# Patient Record
Sex: FEMALE | Race: WHITE | NOT HISPANIC OR LATINO | Employment: FULL TIME | URBAN - METROPOLITAN AREA
[De-identification: names, ages, dates, MRNs, and addresses within clinical notes are randomized per-mention and may not be internally consistent; named-entity substitution may affect disease eponyms.]

---

## 2023-05-17 ENCOUNTER — APPOINTMENT (OUTPATIENT)
Dept: PRIMARY CARE | Facility: CLINIC | Age: 43
End: 2023-05-17
Payer: COMMERCIAL

## 2023-05-17 PROBLEM — F41.9 ANXIETY DISORDER: Status: ACTIVE | Noted: 2023-05-17

## 2023-05-17 PROBLEM — G47.00 INSOMNIA: Status: ACTIVE | Noted: 2023-05-17

## 2023-05-17 PROBLEM — F32.A DEPRESSION: Status: ACTIVE | Noted: 2023-05-17

## 2023-05-17 PROBLEM — E03.9 HYPOTHYROIDISM, ADULT: Status: ACTIVE | Noted: 2023-05-17

## 2023-05-22 ENCOUNTER — OFFICE VISIT (OUTPATIENT)
Dept: PRIMARY CARE | Facility: CLINIC | Age: 43
End: 2023-05-22
Payer: COMMERCIAL

## 2023-05-22 ENCOUNTER — LAB (OUTPATIENT)
Dept: LAB | Facility: LAB | Age: 43
End: 2023-05-22
Payer: COMMERCIAL

## 2023-05-22 VITALS
BODY MASS INDEX: 39.71 KG/M2 | DIASTOLIC BLOOD PRESSURE: 66 MMHG | HEART RATE: 82 BPM | RESPIRATION RATE: 16 BRPM | TEMPERATURE: 97.3 F | SYSTOLIC BLOOD PRESSURE: 108 MMHG | WEIGHT: 253 LBS | HEIGHT: 67 IN

## 2023-05-22 DIAGNOSIS — R53.83 FATIGUE, UNSPECIFIED TYPE: ICD-10-CM

## 2023-05-22 DIAGNOSIS — Z00.00 HEALTH CARE MAINTENANCE: ICD-10-CM

## 2023-05-22 DIAGNOSIS — G47.33 OSA (OBSTRUCTIVE SLEEP APNEA): Primary | ICD-10-CM

## 2023-05-22 DIAGNOSIS — L60.9 NAIL ABNORMALITIES: ICD-10-CM

## 2023-05-22 LAB
ALANINE AMINOTRANSFERASE (SGPT) (U/L) IN SER/PLAS: 15 U/L (ref 7–45)
ALBUMIN (G/DL) IN SER/PLAS: 4.2 G/DL (ref 3.4–5)
ALKALINE PHOSPHATASE (U/L) IN SER/PLAS: 50 U/L (ref 33–110)
ANION GAP IN SER/PLAS: 11 MMOL/L (ref 10–20)
ASPARTATE AMINOTRANSFERASE (SGOT) (U/L) IN SER/PLAS: 17 U/L (ref 9–39)
BILIRUBIN TOTAL (MG/DL) IN SER/PLAS: 0.2 MG/DL (ref 0–1.2)
CALCIUM (MG/DL) IN SER/PLAS: 9.2 MG/DL (ref 8.6–10.3)
CARBON DIOXIDE, TOTAL (MMOL/L) IN SER/PLAS: 27 MMOL/L (ref 21–32)
CHLORIDE (MMOL/L) IN SER/PLAS: 102 MMOL/L (ref 98–107)
COBALAMIN (VITAMIN B12) (PG/ML) IN SER/PLAS: 374 PG/ML (ref 211–911)
CREATININE (MG/DL) IN SER/PLAS: 0.92 MG/DL (ref 0.5–1.05)
ERYTHROCYTE DISTRIBUTION WIDTH (RATIO) BY AUTOMATED COUNT: 17.2 % (ref 11.5–14.5)
ERYTHROCYTE MEAN CORPUSCULAR HEMOGLOBIN CONCENTRATION (G/DL) BY AUTOMATED: 30.5 G/DL (ref 32–36)
ERYTHROCYTE MEAN CORPUSCULAR VOLUME (FL) BY AUTOMATED COUNT: 82 FL (ref 80–100)
ERYTHROCYTES (10*6/UL) IN BLOOD BY AUTOMATED COUNT: 4.45 X10E12/L (ref 4–5.2)
GFR FEMALE: 80 ML/MIN/1.73M2
GLUCOSE (MG/DL) IN SER/PLAS: 79 MG/DL (ref 74–99)
HEMATOCRIT (%) IN BLOOD BY AUTOMATED COUNT: 36.4 % (ref 36–46)
HEMOGLOBIN (G/DL) IN BLOOD: 11.1 G/DL (ref 12–16)
LEUKOCYTES (10*3/UL) IN BLOOD BY AUTOMATED COUNT: 8.9 X10E9/L (ref 4.4–11.3)
PLATELETS (10*3/UL) IN BLOOD AUTOMATED COUNT: 353 X10E9/L (ref 150–450)
POTASSIUM (MMOL/L) IN SER/PLAS: 4.5 MMOL/L (ref 3.5–5.3)
PROTEIN TOTAL: 7.6 G/DL (ref 6.4–8.2)
SODIUM (MMOL/L) IN SER/PLAS: 135 MMOL/L (ref 136–145)
UREA NITROGEN (MG/DL) IN SER/PLAS: 16 MG/DL (ref 6–23)

## 2023-05-22 PROCEDURE — 83550 IRON BINDING TEST: CPT

## 2023-05-22 PROCEDURE — 80053 COMPREHEN METABOLIC PANEL: CPT

## 2023-05-22 PROCEDURE — 85027 COMPLETE CBC AUTOMATED: CPT

## 2023-05-22 PROCEDURE — 83540 ASSAY OF IRON: CPT

## 2023-05-22 PROCEDURE — 99213 OFFICE O/P EST LOW 20 MIN: CPT | Performed by: FAMILY MEDICINE

## 2023-05-22 PROCEDURE — 82728 ASSAY OF FERRITIN: CPT

## 2023-05-22 PROCEDURE — 82607 VITAMIN B-12: CPT

## 2023-05-22 PROCEDURE — 36415 COLL VENOUS BLD VENIPUNCTURE: CPT

## 2023-05-22 PROCEDURE — 1036F TOBACCO NON-USER: CPT | Performed by: FAMILY MEDICINE

## 2023-05-22 PROCEDURE — 84630 ASSAY OF ZINC: CPT

## 2023-05-22 RX ORDER — LEVOTHYROXINE SODIUM 50 UG/1
1 TABLET ORAL DAILY
COMMUNITY
End: 2024-02-05 | Stop reason: SDUPTHER

## 2023-05-22 RX ORDER — VENLAFAXINE 75 MG/1
75 TABLET ORAL DAILY
COMMUNITY
End: 2023-05-31

## 2023-05-22 RX ORDER — NORETHINDRONE ACETATE AND ETHINYL ESTRADIOL 1; 20 MG/1; UG/1
1 TABLET ORAL DAILY
COMMUNITY
Start: 2023-05-09 | End: 2024-03-04

## 2023-05-22 RX ORDER — SERTRALINE HYDROCHLORIDE 25 MG/1
25 TABLET, FILM COATED ORAL DAILY
COMMUNITY
Start: 2023-05-15 | End: 2023-12-19 | Stop reason: ALTCHOICE

## 2023-05-22 RX ORDER — HYDROXYZINE HYDROCHLORIDE 25 MG/1
1 TABLET, FILM COATED ORAL 3 TIMES DAILY PRN
COMMUNITY
Start: 2022-06-28 | End: 2023-12-19 | Stop reason: ALTCHOICE

## 2023-05-22 ASSESSMENT — PATIENT HEALTH QUESTIONNAIRE - PHQ9
10. IF YOU CHECKED OFF ANY PROBLEMS, HOW DIFFICULT HAVE THESE PROBLEMS MADE IT FOR YOU TO DO YOUR WORK, TAKE CARE OF THINGS AT HOME, OR GET ALONG WITH OTHER PEOPLE: SOMEWHAT DIFFICULT
2. FEELING DOWN, DEPRESSED OR HOPELESS: SEVERAL DAYS
1. LITTLE INTEREST OR PLEASURE IN DOING THINGS: SEVERAL DAYS
SUM OF ALL RESPONSES TO PHQ9 QUESTIONS 1 & 2: 2

## 2023-05-22 NOTE — PROGRESS NOTES
"Subjective   Patient ID: Minal Stuart is a 42 y.o. female who presents for OTHER (Fingernail  from skin underneath).    Started a few weeks ago   no chemicals or trama   started thyroid in February March   Been tired   Dx WING  Weaning off desvenlafaxine  and going to try zoloft  only new med is thyroid and will be getting it checked soon         Review of Systems    Objective   /66 (BP Location: Right arm, Patient Position: Sitting, BP Cuff Size: Large adult)   Pulse 82   Temp 36.3 °C (97.3 °F) (Temporal)   Resp 16   Ht 1.702 m (5' 7\")   Wt 115 kg (253 lb)   BMI 39.63 kg/m²     Physical Exam  Skin:     Comments: Lift corner nails right 1,2,4 and left, 2,4          Assessment/Plan   Problem List Items Addressed This Visit          Nervous    WING (obstructive sleep apnea) - Primary     Getting that done           Other Visit Diagnoses       Health care maintenance        Fatigue, unspecified type        Relevant Orders    CBC    Comprehensive Metabolic Panel    Vitamin B12    Nail abnormalities        Relevant Orders    Zinc               "

## 2023-05-23 DIAGNOSIS — D64.9 ANEMIA, UNSPECIFIED TYPE: Primary | ICD-10-CM

## 2023-05-23 DIAGNOSIS — D50.9 IRON DEFICIENCY ANEMIA, UNSPECIFIED IRON DEFICIENCY ANEMIA TYPE: Primary | ICD-10-CM

## 2023-05-23 LAB
FERRITIN (UG/LL) IN SER/PLAS: 8 UG/L (ref 8–150)
IRON (UG/DL) IN SER/PLAS: 30 UG/DL (ref 35–150)
IRON BINDING CAPACITY (UG/DL) IN SER/PLAS: 460 UG/DL (ref 240–445)
IRON SATURATION (%) IN SER/PLAS: 7 % (ref 25–45)

## 2023-05-25 LAB — ZINC,SERUM OR PLASMA: 68.3 UG/DL (ref 60–120)

## 2023-05-31 ENCOUNTER — OFFICE VISIT (OUTPATIENT)
Dept: PRIMARY CARE | Facility: CLINIC | Age: 43
End: 2023-05-31
Payer: COMMERCIAL

## 2023-05-31 VITALS
RESPIRATION RATE: 16 BRPM | SYSTOLIC BLOOD PRESSURE: 98 MMHG | WEIGHT: 248.3 LBS | HEART RATE: 88 BPM | BODY MASS INDEX: 38.89 KG/M2 | TEMPERATURE: 96.8 F | DIASTOLIC BLOOD PRESSURE: 76 MMHG

## 2023-05-31 DIAGNOSIS — N92.0 MENORRHAGIA WITH REGULAR CYCLE: ICD-10-CM

## 2023-05-31 DIAGNOSIS — D50.0 IRON DEFICIENCY ANEMIA DUE TO CHRONIC BLOOD LOSS: Primary | ICD-10-CM

## 2023-05-31 DIAGNOSIS — E03.9 HYPOTHYROIDISM, ADULT: ICD-10-CM

## 2023-05-31 LAB
ALANINE AMINOTRANSFERASE (SGPT) (U/L) IN SER/PLAS: 17 U/L (ref 7–45)
ALBUMIN (G/DL) IN SER/PLAS: 4.1 G/DL (ref 3.4–5)
ALKALINE PHOSPHATASE (U/L) IN SER/PLAS: 53 U/L (ref 33–110)
ANION GAP IN SER/PLAS: 13 MMOL/L (ref 10–20)
ASPARTATE AMINOTRANSFERASE (SGOT) (U/L) IN SER/PLAS: 19 U/L (ref 9–39)
BILIRUBIN TOTAL (MG/DL) IN SER/PLAS: 0.4 MG/DL (ref 0–1.2)
CALCIUM (MG/DL) IN SER/PLAS: 9.3 MG/DL (ref 8.6–10.3)
CARBON DIOXIDE, TOTAL (MMOL/L) IN SER/PLAS: 27 MMOL/L (ref 21–32)
CHLORIDE (MMOL/L) IN SER/PLAS: 103 MMOL/L (ref 98–107)
CREATININE (MG/DL) IN SER/PLAS: 0.97 MG/DL (ref 0.5–1.05)
GFR FEMALE: 75 ML/MIN/1.73M2
GLUCOSE (MG/DL) IN SER/PLAS: 84 MG/DL (ref 74–99)
POTASSIUM (MMOL/L) IN SER/PLAS: 5 MMOL/L (ref 3.5–5.3)
PROTEIN TOTAL: 7.5 G/DL (ref 6.4–8.2)
SODIUM (MMOL/L) IN SER/PLAS: 138 MMOL/L (ref 136–145)
THYROTROPIN (MIU/L) IN SER/PLAS BY DETECTION LIMIT <= 0.05 MIU/L: 2.48 MIU/L (ref 0.44–3.98)
THYROXINE (T4) FREE (NG/DL) IN SER/PLAS: 0.73 NG/DL (ref 0.61–1.12)
UREA NITROGEN (MG/DL) IN SER/PLAS: 11 MG/DL (ref 6–23)

## 2023-05-31 PROCEDURE — 99213 OFFICE O/P EST LOW 20 MIN: CPT | Performed by: FAMILY MEDICINE

## 2023-05-31 PROCEDURE — 1036F TOBACCO NON-USER: CPT | Performed by: FAMILY MEDICINE

## 2023-05-31 ASSESSMENT — PATIENT HEALTH QUESTIONNAIRE - PHQ9
2. FEELING DOWN, DEPRESSED OR HOPELESS: NOT AT ALL
1. LITTLE INTEREST OR PLEASURE IN DOING THINGS: SEVERAL DAYS
SUM OF ALL RESPONSES TO PHQ9 QUESTIONS 1 & 2: 1
10. IF YOU CHECKED OFF ANY PROBLEMS, HOW DIFFICULT HAVE THESE PROBLEMS MADE IT FOR YOU TO DO YOUR WORK, TAKE CARE OF THINGS AT HOME, OR GET ALONG WITH OTHER PEOPLE: SOMEWHAT DIFFICULT

## 2023-05-31 NOTE — PROGRESS NOTES
Subjective   Patient ID: Minal Stuart is a 42 y.o. female who presents for Menorrhagia (Irregular menses on an off since Nov, this am very heavy blood flow with clots).    Saw GYN and put on birth control for bleeding, has had cramping for a few week on the BCP which started last month. Took placebo pill last night and having heavy bleeding this morning with clots          Review of Systems    Objective   BP 98/76 (BP Location: Right arm, Patient Position: Sitting, BP Cuff Size: Large adult)   Pulse 88   Temp 36 °C (96.8 °F) (Temporal)   Resp 16   Wt 113 kg (248 lb 4.8 oz)   BMI 38.89 kg/m²     Physical Exam  Vitals and nursing note reviewed.   Constitutional:       General: She is not in acute distress.     Appearance: She is not ill-appearing.   HENT:      Head: Normocephalic and atraumatic.   Eyes:      Conjunctiva/sclera: Conjunctivae normal.   Cardiovascular:      Rate and Rhythm: Normal rate and regular rhythm.      Heart sounds: Normal heart sounds.   Pulmonary:      Effort: Pulmonary effort is normal.      Breath sounds: Normal breath sounds.   Skin:     General: Skin is warm.   Neurological:      Mental Status: She is alert.   Psychiatric:         Mood and Affect: Mood normal.         Thought Content: Thought content normal.         Judgment: Judgment normal.         Assessment/Plan   Problem List Items Addressed This Visit          Endocrine/Metabolic    Hypothyroidism, adult     Other Visit Diagnoses       Iron deficiency anemia due to chronic blood loss    -  Primary    Relevant Orders    CBC    Ferritin    Iron and TIBC

## 2023-06-26 ENCOUNTER — LAB (OUTPATIENT)
Dept: LAB | Facility: LAB | Age: 43
End: 2023-06-26
Payer: COMMERCIAL

## 2023-06-26 DIAGNOSIS — D50.9 IRON DEFICIENCY ANEMIA, UNSPECIFIED IRON DEFICIENCY ANEMIA TYPE: ICD-10-CM

## 2023-06-26 LAB
BASOPHILS (10*3/UL) IN BLOOD BY AUTOMATED COUNT: 0.04 X10E9/L (ref 0–0.1)
BASOPHILS/100 LEUKOCYTES IN BLOOD BY AUTOMATED COUNT: 0.6 % (ref 0–2)
EOSINOPHILS (10*3/UL) IN BLOOD BY AUTOMATED COUNT: 0.29 X10E9/L (ref 0–0.7)
EOSINOPHILS/100 LEUKOCYTES IN BLOOD BY AUTOMATED COUNT: 4.6 % (ref 0–6)
ERYTHROCYTE DISTRIBUTION WIDTH (RATIO) BY AUTOMATED COUNT: 16.5 % (ref 11.5–14.5)
ERYTHROCYTE MEAN CORPUSCULAR HEMOGLOBIN CONCENTRATION (G/DL) BY AUTOMATED: 31.3 G/DL (ref 32–36)
ERYTHROCYTE MEAN CORPUSCULAR VOLUME (FL) BY AUTOMATED COUNT: 84 FL (ref 80–100)
ERYTHROCYTES (10*6/UL) IN BLOOD BY AUTOMATED COUNT: 4.7 X10E12/L (ref 4–5.2)
FERRITIN (UG/LL) IN SER/PLAS: 30 UG/L (ref 8–150)
HEMATOCRIT (%) IN BLOOD BY AUTOMATED COUNT: 39.6 % (ref 36–46)
HEMOGLOBIN (G/DL) IN BLOOD: 12.4 G/DL (ref 12–16)
IMMATURE GRANULOCYTES/100 LEUKOCYTES IN BLOOD BY AUTOMATED COUNT: 0.2 % (ref 0–0.9)
IRON (UG/DL) IN SER/PLAS: 43 UG/DL (ref 35–150)
IRON BINDING CAPACITY (UG/DL) IN SER/PLAS: 386 UG/DL (ref 240–445)
IRON SATURATION (%) IN SER/PLAS: 11 % (ref 25–45)
LEUKOCYTES (10*3/UL) IN BLOOD BY AUTOMATED COUNT: 6.3 X10E9/L (ref 4.4–11.3)
LYMPHOCYTES (10*3/UL) IN BLOOD BY AUTOMATED COUNT: 2.04 X10E9/L (ref 1.2–4.8)
LYMPHOCYTES/100 LEUKOCYTES IN BLOOD BY AUTOMATED COUNT: 32.3 % (ref 13–44)
MONOCYTES (10*3/UL) IN BLOOD BY AUTOMATED COUNT: 0.49 X10E9/L (ref 0.1–1)
MONOCYTES/100 LEUKOCYTES IN BLOOD BY AUTOMATED COUNT: 7.8 % (ref 2–10)
NEUTROPHILS (10*3/UL) IN BLOOD BY AUTOMATED COUNT: 3.44 X10E9/L (ref 1.2–7.7)
NEUTROPHILS/100 LEUKOCYTES IN BLOOD BY AUTOMATED COUNT: 54.5 % (ref 40–80)
PLATELETS (10*3/UL) IN BLOOD AUTOMATED COUNT: 304 X10E9/L (ref 150–450)

## 2023-06-26 PROCEDURE — 36415 COLL VENOUS BLD VENIPUNCTURE: CPT

## 2023-06-26 PROCEDURE — 83550 IRON BINDING TEST: CPT

## 2023-06-26 PROCEDURE — 82728 ASSAY OF FERRITIN: CPT

## 2023-06-26 PROCEDURE — 83540 ASSAY OF IRON: CPT

## 2023-06-28 LAB
ALLERGEN ANIMAL: CAT DANDER IGE (KU/L): 16.2 KU/L
ALLERGEN ANIMAL: DOG DANDER IGE (KU/L): 9.18 KU/L
ALLERGEN GRASS: BERMUDA GRASS (CYNODON DACTYLON) IGE (KU/L): 0.18 KU/L
ALLERGEN GRASS: JOHNSON GRASS (SORGHUM HALEPENSE) IGE (KU/L): 0.17 KU/L
ALLERGEN GRASS: MEADOW GRASS, KENTUCKY BLUE (POA PRATENSIS )IGE (KU/L): 0.11 KU/L
ALLERGEN GRASS: TIMOTHY GRASS (PHLEUM PRATENSE) IGE (KU/L): 0.19 KU/L
ALLERGEN INSECT: COCKROACH IGE: <0.1 KU/L
ALLERGEN MICROORGANISM: ALTERNARIA ALTERNATA IGE (KU/L): 0.63 KU/L
ALLERGEN MICROORGANISM: ASPERGILLUS FUMIGATUS IGE (KU/L): 0.6 KU/L
ALLERGEN MICROORGANISM: CLADOSPORIUM HERBARUM IGE (KU/L): 0.23 KU/L
ALLERGEN MICROORGANISM: PENICILLIUM CHRYSOGENUM (P. NOTATUM) IGE (KU/L): 0.54 KU/L
ALLERGEN MITE: DERMATOPHAGOIDES FARINAE (HOUSE DUST MITE) IGE (KU/L): 0.3 KU/L
ALLERGEN MITE: DERMATOPHAGOIDES PTERONYSSINUS (HOUSE DUST MITE) IGE (KU/L): 0.22 KU/L
ALLERGEN TREE: BOX-ELDER (ACER NEGUNDO) IGE (KU/L): 0.2 KU/L
ALLERGEN TREE: COMMON SILVER BIRCH (BETULA VERRUCOSA) IGE (KU/L): 0.29 KU/L
ALLERGEN TREE: COTTONWOOD (POPULUS DELTOIDES) IGE (KU/L): 0.12 KU/L
ALLERGEN TREE: ELM (ULMUS AMERICANA) IGE (KU/L): 1.06 KU/L
ALLERGEN TREE: MAPLE LEAF SYCAMORE, LONDON PLANE IGE (KU/L): 0.35 KU/L
ALLERGEN TREE: MOUNTAIN JUNIPER (JUNIPERUS SABINOIDES) IGE (KU/L): 0.2 KU/L
ALLERGEN TREE: MULBERRY (MORUS ALBA) IGE (KU/L): <0.1 KU/L
ALLERGEN TREE: OAK (QUERCUS ALBA) IGE (KU/L): 0.23 KU/L
ALLERGEN TREE: PECAN, HICKORY (CARYA PECAN) IGE (KU/L): 0.7 KU/L
ALLERGEN TREE: WALNUT IGE: 0.91 KU/L
ALLERGEN TREE: WHITE ASH (FRAXINUS AMERICANA) IGE (KU/L): 0.18 KU/L
ALLERGEN WEED: COMMON PIGWEED (AMARANTHUS RETROFLEXUS) IGE (KU/L): 0.84 KU/L
ALLERGEN WEED: COMMON RAGWEED (AMB. ARTEMISIIFOLIA/A. ELATIOR) IGE (KU/L): 1.17 KU/L
ALLERGEN WEED: GOOSEFOOT, LAMB'S QUARTERS (CHENOPODIUM ALBUM) IGE (KU/L): 0.23 KU/L
ALLERGEN WEED: PLANTAIN (ENGLISH), RIBWORT (PLANTAGO LANCEOLATA) IGE (KU/L): <0.1 KU/L
ALLERGEN WEED: PRICKLY SALTWORT/RUSSIAN THISTLE (SALSOLA KALI) IGE (KU/L): 0.31 KU/L
ALLERGEN WEED: SHEEP SORREL (RUMEX ACETOSELLA) IGE (KU/L): <0.1 KU/L
IMMUNOCAP IGE: 402 KU/L (ref 0–214)
IMMUNOCAP INTERPRETATION: ABNORMAL

## 2023-07-14 ASSESSMENT — PROMIS GLOBAL HEALTH SCALE
EMOTIONAL_PROBLEMS: OFTEN
RATE_SOCIAL_SATISFACTION: VERY GOOD
CARRYOUT_SOCIAL_ACTIVITIES: GOOD
RATE_AVERAGE_PAIN: 2
CARRYOUT_PHYSICAL_ACTIVITIES: COMPLETELY
RATE_AVERAGE_FATIGUE: MODERATE
RATE_PHYSICAL_HEALTH: GOOD
RATE_GENERAL_HEALTH: GOOD
RATE_QUALITY_OF_LIFE: GOOD
RATE_MENTAL_HEALTH: FAIR

## 2023-07-17 ENCOUNTER — OFFICE VISIT (OUTPATIENT)
Dept: PRIMARY CARE | Facility: CLINIC | Age: 43
End: 2023-07-17
Payer: COMMERCIAL

## 2023-07-17 VITALS
WEIGHT: 248.6 LBS | DIASTOLIC BLOOD PRESSURE: 78 MMHG | HEIGHT: 67 IN | RESPIRATION RATE: 16 BRPM | HEART RATE: 76 BPM | TEMPERATURE: 97.5 F | SYSTOLIC BLOOD PRESSURE: 126 MMHG | BODY MASS INDEX: 39.02 KG/M2

## 2023-07-17 DIAGNOSIS — R53.83 FATIGUE, UNSPECIFIED TYPE: ICD-10-CM

## 2023-07-17 DIAGNOSIS — L60.9 NAIL ABNORMALITIES: ICD-10-CM

## 2023-07-17 DIAGNOSIS — M77.8 SHOULDER TENDONITIS, LEFT: ICD-10-CM

## 2023-07-17 DIAGNOSIS — Z12.31 SCREENING MAMMOGRAM, ENCOUNTER FOR: ICD-10-CM

## 2023-07-17 DIAGNOSIS — R19.5 CHANGE IN STOOL: ICD-10-CM

## 2023-07-17 DIAGNOSIS — Z00.00 ROUTINE GENERAL MEDICAL EXAMINATION AT A HEALTH CARE FACILITY: Primary | ICD-10-CM

## 2023-07-17 PROCEDURE — 99396 PREV VISIT EST AGE 40-64: CPT | Performed by: FAMILY MEDICINE

## 2023-07-17 PROCEDURE — 1036F TOBACCO NON-USER: CPT | Performed by: FAMILY MEDICINE

## 2023-07-17 RX ORDER — GLUC/MSM/COLGN2/HYAL/ANTIARTH3 375-375-20
27 TABLET ORAL DAILY
COMMUNITY

## 2023-07-17 NOTE — PROGRESS NOTES
"Subjective   Patient ID: Minal Stuart is a 42 y.o. female who presents for Annual Exam (Chronic lose stool/ diarrhea for about 6 months daily.  ), Nail Problem (Patient C/O her nails are breaking away from her skin. Was seen March /April for thew same issue. ), and Shoulder Pain (Left shoulder pain with movement was discussed at last annual exam).    Left shoulder still sore   Chronically loose (formed)   can get thing and light brown with urgency for 6 months or more and diarrhea   GYN started on BCP in May and menses spotting and cramps gets cramps and heavier menses     Well Adult Physical   Patient here for a comprehensive physical exam.The patient reports problems - several     Do you take any herbs or supplements that were not prescribed by a doctor? yes   Are you taking calcium supplements? no   Are you taking aspirin daily? no     History:  Last pap date: per GYN  Abnormal pap? no               Review of Systems    Objective   /78   Pulse 76   Temp 36.4 °C (97.5 °F)   Resp 16   Ht 1.702 m (5' 7\")   Wt 113 kg (248 lb 9.6 oz)   BMI 38.94 kg/m²     Physical Exam    Assessment/Plan   Problem List Items Addressed This Visit    None  Visit Diagnoses       Nail abnormalities    -  Primary    Relevant Orders    Referral to Dermatology    Shoulder tendonitis, left        Relevant Orders    Referral to Physical Therapy    Change in stool        Relevant Orders    Referral to Gastroenterology    Screening mammogram, encounter for        Relevant Orders    BI mammo bilateral screening tomosynthesis    Fatigue, unspecified type        Relevant Orders    Referral to Rheumatology               "

## 2023-08-31 LAB
ANTI-CENTROMERE: <0.2 AI
ANTI-CHROMATIN: <0.2 AI
ANTI-DNA (DS): 1 IU/ML
ANTI-JO-1 IGG: <0.2 AI
ANTI-NUCLEAR ANTIBODY (ANA): NEGATIVE
ANTI-RIBOSOMAL P: <0.2 AI
ANTI-RNP: <0.2 AI
ANTI-SCL-70: <0.2 AI
ANTI-SM/RNP: <0.2 AI
ANTI-SM: <0.2 AI
ANTI-SSA: <0.2 AI
ANTI-SSB: <0.2 AI

## 2023-09-25 DIAGNOSIS — D50.0 IRON DEFICIENCY ANEMIA DUE TO CHRONIC BLOOD LOSS: Primary | ICD-10-CM

## 2023-09-28 ENCOUNTER — LAB (OUTPATIENT)
Dept: LAB | Facility: LAB | Age: 43
End: 2023-09-28
Payer: COMMERCIAL

## 2023-09-28 DIAGNOSIS — D50.0 IRON DEFICIENCY ANEMIA DUE TO CHRONIC BLOOD LOSS: ICD-10-CM

## 2023-09-28 LAB
ERYTHROCYTE DISTRIBUTION WIDTH (RATIO) BY AUTOMATED COUNT: 14.1 % (ref 11.5–14.5)
ERYTHROCYTE MEAN CORPUSCULAR HEMOGLOBIN CONCENTRATION (G/DL) BY AUTOMATED: 31.4 G/DL (ref 32–36)
ERYTHROCYTE MEAN CORPUSCULAR VOLUME (FL) BY AUTOMATED COUNT: 85 FL (ref 80–100)
ERYTHROCYTES (10*6/UL) IN BLOOD BY AUTOMATED COUNT: 5.08 X10E12/L (ref 4–5.2)
HEMATOCRIT (%) IN BLOOD BY AUTOMATED COUNT: 43.3 % (ref 36–46)
HEMOGLOBIN (G/DL) IN BLOOD: 13.6 G/DL (ref 12–16)
IRON (UG/DL) IN SER/PLAS: 64 UG/DL (ref 35–150)
IRON BINDING CAPACITY (UG/DL) IN SER/PLAS: 386 UG/DL (ref 240–445)
IRON SATURATION (%) IN SER/PLAS: 17 % (ref 25–45)
LEUKOCYTES (10*3/UL) IN BLOOD BY AUTOMATED COUNT: 8.5 X10E9/L (ref 4.4–11.3)
PLATELETS (10*3/UL) IN BLOOD AUTOMATED COUNT: 329 X10E9/L (ref 150–450)

## 2023-09-28 PROCEDURE — 83540 ASSAY OF IRON: CPT

## 2023-09-28 PROCEDURE — 83550 IRON BINDING TEST: CPT

## 2023-09-28 PROCEDURE — 85027 COMPLETE CBC AUTOMATED: CPT

## 2023-09-28 PROCEDURE — 36415 COLL VENOUS BLD VENIPUNCTURE: CPT

## 2023-09-28 PROCEDURE — 82728 ASSAY OF FERRITIN: CPT

## 2023-09-29 LAB — FERRITIN (UG/LL) IN SER/PLAS: 36 UG/L (ref 8–150)

## 2023-12-01 ENCOUNTER — ANCILLARY PROCEDURE (OUTPATIENT)
Dept: RADIOLOGY | Facility: CLINIC | Age: 43
End: 2023-12-01
Payer: COMMERCIAL

## 2023-12-01 DIAGNOSIS — Z12.31 ENCOUNTER FOR SCREENING MAMMOGRAM FOR MALIGNANT NEOPLASM OF BREAST: ICD-10-CM

## 2023-12-01 PROCEDURE — 77063 BREAST TOMOSYNTHESIS BI: CPT | Mod: BILATERAL PROCEDURE | Performed by: STUDENT IN AN ORGANIZED HEALTH CARE EDUCATION/TRAINING PROGRAM

## 2023-12-01 PROCEDURE — 77067 SCR MAMMO BI INCL CAD: CPT | Mod: BILATERAL PROCEDURE | Performed by: STUDENT IN AN ORGANIZED HEALTH CARE EDUCATION/TRAINING PROGRAM

## 2023-12-01 PROCEDURE — 77067 SCR MAMMO BI INCL CAD: CPT

## 2023-12-04 DIAGNOSIS — N92.6 IRREGULAR MENSTRUATION, UNSPECIFIED: ICD-10-CM

## 2023-12-04 DIAGNOSIS — E03.9 HYPOTHYROIDISM, UNSPECIFIED: Primary | ICD-10-CM

## 2023-12-19 ENCOUNTER — OFFICE VISIT (OUTPATIENT)
Dept: OBSTETRICS AND GYNECOLOGY | Facility: CLINIC | Age: 43
End: 2023-12-19
Payer: COMMERCIAL

## 2023-12-19 VITALS
DIASTOLIC BLOOD PRESSURE: 80 MMHG | BODY MASS INDEX: 38.58 KG/M2 | HEIGHT: 68 IN | WEIGHT: 254.6 LBS | SYSTOLIC BLOOD PRESSURE: 124 MMHG

## 2023-12-19 DIAGNOSIS — G43.009 MIGRAINE WITHOUT AURA AND WITHOUT STATUS MIGRAINOSUS, NOT INTRACTABLE: ICD-10-CM

## 2023-12-19 DIAGNOSIS — N93.9 ABNORMAL UTERINE BLEEDING (AUB): Primary | ICD-10-CM

## 2023-12-19 PROCEDURE — 99213 OFFICE O/P EST LOW 20 MIN: CPT | Performed by: OBSTETRICS & GYNECOLOGY

## 2023-12-19 PROCEDURE — 1036F TOBACCO NON-USER: CPT | Performed by: OBSTETRICS & GYNECOLOGY

## 2023-12-19 RX ORDER — SERTRALINE HYDROCHLORIDE 100 MG/1
100 TABLET, FILM COATED ORAL DAILY
COMMUNITY
End: 2024-03-18 | Stop reason: SDUPTHER

## 2023-12-19 NOTE — PROGRESS NOTES
"Assessment   IMPRESSIONS:    1. Abnormal uterine bleeding (AUB)  - overall bleeding pattern has improved since last year with OCP but last month abnormal with a prolonged episode of spotting. EMB last year normal.   - other symptoms include consistent excessive sweating - not in waves like typical hot flashes, labial irritation - now resolved, headaches, brittle nails. Discussed that its difficult to attribute these symptoms to her reproductive hormones, especially while on an OCP. She has lab work orders in place but discussed that the estradiol, FSH and LH are going to be reflective of being on an OCP. She has other labs ordered for her thyroid so recommend proceeding with this workup. Recommend continuing to monitor menses since they are overall improved. If they worsen again then may need to repeat workup  including pelvic ultrasound and endometrial sampling. Can consider changing treatment.     2. Migraine without aura and without status migrainosus, not intractable  - Referral to Neurology; Future    Follow up in 5/2024 for annual or sooner PRN based on symptoms.    Barbara Santoro MD      Subjective   Minal Stuart is a 43 y.o. female here for follow up for AUB and other symptoms    Patient reports her periods are more regular overall. She typically has 2 hours of very heavy bleeding and then it improved. Last month she also had 10 days of spotting prior to her menses paired with 10 days of migraines    She has migraines about twice per month. Last one was for 10 days. She has never seen a neurologist. Desires a referral.    Irritation of labia - resolved    C/o excessive sweating. Continuous - not in waves.     Gynecologic History:    Last Pap: 2020 - NILm/neg HPV    Past medical history, surgical history, social history, family history, medications and allergies were reviewed and edited as needed      Objective   /80   Ht 1.727 m (5' 8\")   Wt 115 kg (254 lb 9.6 oz)   LMP  (LMP Unknown)   " BMI 38.71 kg/m²     No exam performed today

## 2023-12-21 DIAGNOSIS — N93.9 ABNORMAL UTERINE BLEEDING (AUB): Primary | ICD-10-CM

## 2023-12-29 ENCOUNTER — LAB (OUTPATIENT)
Dept: LAB | Facility: LAB | Age: 43
End: 2023-12-29
Payer: COMMERCIAL

## 2023-12-29 DIAGNOSIS — N93.9 ABNORMAL UTERINE BLEEDING (AUB): ICD-10-CM

## 2023-12-29 DIAGNOSIS — E03.9 HYPOTHYROIDISM, UNSPECIFIED: ICD-10-CM

## 2023-12-29 DIAGNOSIS — N92.6 IRREGULAR MENSTRUATION, UNSPECIFIED: ICD-10-CM

## 2023-12-29 DIAGNOSIS — D50.9 IRON DEFICIENCY ANEMIA, UNSPECIFIED IRON DEFICIENCY ANEMIA TYPE: ICD-10-CM

## 2023-12-29 LAB
ALBUMIN SERPL BCP-MCNC: 4.1 G/DL (ref 3.4–5)
ALP SERPL-CCNC: 57 U/L (ref 33–110)
ALT SERPL W P-5'-P-CCNC: 21 U/L (ref 7–45)
ANION GAP SERPL CALC-SCNC: 13 MMOL/L (ref 10–20)
AST SERPL W P-5'-P-CCNC: 19 U/L (ref 9–39)
BILIRUB SERPL-MCNC: 0.4 MG/DL (ref 0–1.2)
BUN SERPL-MCNC: 11 MG/DL (ref 6–23)
CALCIUM SERPL-MCNC: 8.7 MG/DL (ref 8.6–10.3)
CHLORIDE SERPL-SCNC: 101 MMOL/L (ref 98–107)
CO2 SERPL-SCNC: 28 MMOL/L (ref 21–32)
CREAT SERPL-MCNC: 0.93 MG/DL (ref 0.5–1.05)
ERYTHROCYTE [DISTWIDTH] IN BLOOD BY AUTOMATED COUNT: 13.7 % (ref 11.5–14.5)
GFR SERPL CREATININE-BSD FRML MDRD: 79 ML/MIN/1.73M*2
GLUCOSE SERPL-MCNC: 85 MG/DL (ref 74–99)
HCT VFR BLD AUTO: 43.5 % (ref 36–46)
HGB BLD-MCNC: 14 G/DL (ref 12–16)
IRON SATN MFR SERPL: 21 % (ref 25–45)
IRON SERPL-MCNC: 81 UG/DL (ref 35–150)
MCH RBC QN AUTO: 27.9 PG (ref 26–34)
MCHC RBC AUTO-ENTMCNC: 32.2 G/DL (ref 32–36)
MCV RBC AUTO: 87 FL (ref 80–100)
NRBC BLD-RTO: 0 /100 WBCS (ref 0–0)
PLATELET # BLD AUTO: 310 X10*3/UL (ref 150–450)
POTASSIUM SERPL-SCNC: 5.2 MMOL/L (ref 3.5–5.3)
PROT SERPL-MCNC: 7.1 G/DL (ref 6.4–8.2)
RBC # BLD AUTO: 5.02 X10*6/UL (ref 4–5.2)
SODIUM SERPL-SCNC: 137 MMOL/L (ref 136–145)
T4 FREE SERPL-MCNC: 0.8 NG/DL (ref 0.61–1.12)
TIBC SERPL-MCNC: 378 UG/DL (ref 240–445)
TSH SERPL-ACNC: 2.08 MIU/L (ref 0.44–3.98)
UIBC SERPL-MCNC: 297 UG/DL (ref 110–370)
WBC # BLD AUTO: 6.7 X10*3/UL (ref 4.4–11.3)

## 2023-12-29 PROCEDURE — 84443 ASSAY THYROID STIM HORMONE: CPT

## 2023-12-29 PROCEDURE — 36415 COLL VENOUS BLD VENIPUNCTURE: CPT

## 2023-12-29 PROCEDURE — 83002 ASSAY OF GONADOTROPIN (LH): CPT

## 2023-12-29 PROCEDURE — 82670 ASSAY OF TOTAL ESTRADIOL: CPT

## 2023-12-29 PROCEDURE — 86800 THYROGLOBULIN ANTIBODY: CPT

## 2023-12-29 PROCEDURE — 83540 ASSAY OF IRON: CPT

## 2023-12-29 PROCEDURE — 80053 COMPREHEN METABOLIC PANEL: CPT

## 2023-12-29 PROCEDURE — 85027 COMPLETE CBC AUTOMATED: CPT

## 2023-12-29 PROCEDURE — 84481 FREE ASSAY (FT-3): CPT

## 2023-12-29 PROCEDURE — 82728 ASSAY OF FERRITIN: CPT

## 2023-12-29 PROCEDURE — 84432 ASSAY OF THYROGLOBULIN: CPT

## 2023-12-29 PROCEDURE — 84439 ASSAY OF FREE THYROXINE: CPT

## 2023-12-29 PROCEDURE — 83550 IRON BINDING TEST: CPT

## 2023-12-29 PROCEDURE — 86376 MICROSOMAL ANTIBODY EACH: CPT

## 2023-12-29 PROCEDURE — 83001 ASSAY OF GONADOTROPIN (FSH): CPT

## 2023-12-29 PROCEDURE — 84146 ASSAY OF PROLACTIN: CPT

## 2023-12-30 LAB
ESTRADIOL SERPL-MCNC: 144 PG/ML
FERRITIN SERPL-MCNC: 51 NG/ML (ref 8–150)
FSH SERPL-ACNC: 3 IU/L
LH SERPL-ACNC: 3.6 IU/L
PROLACTIN SERPL-MCNC: 9.9 UG/L (ref 3–20)
T3FREE SERPL-MCNC: 2.9 PG/ML (ref 2.3–4.2)
THYROPEROXIDASE AB SERPL-ACNC: <28 IU/ML

## 2024-01-04 LAB
BILL ONLY-THYROGLOBULIN: NORMAL
THYROGLOB AB SERPL-ACNC: <0.9 IU/ML (ref 0–4)
THYROGLOB SERPL-MCNC: 10.3 NG/ML (ref 1.3–31.8)
THYROGLOB SERPL-MCNC: NORMAL NG/ML (ref 1.3–31.8)

## 2024-02-05 DIAGNOSIS — E03.9 HYPOTHYROIDISM, UNSPECIFIED TYPE: Primary | ICD-10-CM

## 2024-02-05 RX ORDER — LEVOTHYROXINE SODIUM 50 UG/1
50 TABLET ORAL DAILY
Qty: 90 TABLET | Refills: 0 | Status: SHIPPED | OUTPATIENT
Start: 2024-02-05 | End: 2024-05-03

## 2024-02-19 DIAGNOSIS — N92.6 IRREGULAR MENSTRUATION, UNSPECIFIED: ICD-10-CM

## 2024-02-19 DIAGNOSIS — E03.9 HYPOTHYROIDISM, UNSPECIFIED: ICD-10-CM

## 2024-02-19 DIAGNOSIS — G47.8 OTHER SLEEP DISORDERS: ICD-10-CM

## 2024-02-19 DIAGNOSIS — D50.9 IRON DEFICIENCY ANEMIA, UNSPECIFIED: ICD-10-CM

## 2024-02-19 DIAGNOSIS — E55.9 VITAMIN D DEFICIENCY, UNSPECIFIED: Primary | ICD-10-CM

## 2024-03-03 DIAGNOSIS — Z30.9 ENCOUNTER FOR CONTRACEPTIVE MANAGEMENT, UNSPECIFIED TYPE: Primary | ICD-10-CM

## 2024-03-04 RX ORDER — NORETHINDRONE ACETATE AND ETHINYL ESTRADIOL 1; 20 MG/1; UG/1
TABLET ORAL
Qty: 84 TABLET | Refills: 4 | Status: SHIPPED | OUTPATIENT
Start: 2024-03-04

## 2024-03-18 DIAGNOSIS — F41.8 MIXED ANXIETY AND DEPRESSIVE DISORDER: ICD-10-CM

## 2024-03-18 RX ORDER — SERTRALINE HYDROCHLORIDE 100 MG/1
100 TABLET, FILM COATED ORAL DAILY
Qty: 90 TABLET | Refills: 1 | Status: SHIPPED | OUTPATIENT
Start: 2024-03-18

## 2024-03-22 ENCOUNTER — TELEPHONE (OUTPATIENT)
Dept: OBSTETRICS AND GYNECOLOGY | Facility: CLINIC | Age: 44
End: 2024-03-22
Payer: COMMERCIAL

## 2024-03-22 NOTE — TELEPHONE ENCOUNTER
Patient called to schedule appointment with Dr. Santoro. Patient has vaginal odor and concerned for BV. Patient ask if she could come in for a self swab. Patient is currently driving from [place]. Patient stated she would go to urgent care.  ------------- MyChart Message-------------  Fred Santoro,     Since I visited in December, I have continued to have week-long migraines the week before my period, prolonged spotting, and pain (feels like cuts) during my period that require me to use Vagisil wipes. These are all new symptoms within the past 4-5 months. Since then, I have developed a pronounced vaginal odor when not on my period. Even when showering in the morning, I notice the smell by mid-day. I switched to a Vagisil wash, but it has not helped. I have not had any unusual discharge or fever, so I don’t think it is due to an infection.     Are there any supplements or perhaps a different birth control that could help alleviate all these symptoms?      Thank you,  Minal

## 2024-03-27 ENCOUNTER — LAB REQUISITION (OUTPATIENT)
Dept: LAB | Facility: HOSPITAL | Age: 44
End: 2024-03-27
Payer: COMMERCIAL

## 2024-03-27 DIAGNOSIS — R82.998 OTHER ABNORMAL FINDINGS IN URINE: ICD-10-CM

## 2024-03-27 DIAGNOSIS — N89.8 OTHER SPECIFIED NONINFLAMMATORY DISORDERS OF VAGINA: ICD-10-CM

## 2024-03-27 DIAGNOSIS — R31.9 HEMATURIA, UNSPECIFIED: ICD-10-CM

## 2024-03-27 PROCEDURE — 87661 TRICHOMONAS VAGINALIS AMPLIF: CPT

## 2024-03-27 PROCEDURE — 87800 DETECT AGNT MULT DNA DIREC: CPT

## 2024-03-27 PROCEDURE — 87102 FUNGUS ISOLATION CULTURE: CPT

## 2024-03-27 PROCEDURE — 87086 URINE CULTURE/COLONY COUNT: CPT

## 2024-03-28 LAB
BACTERIA UR CULT: NORMAL
C TRACH RRNA SPEC QL NAA+PROBE: NEGATIVE
N GONORRHOEA DNA SPEC QL PROBE+SIG AMP: NEGATIVE
T VAGINALIS RRNA SPEC QL NAA+PROBE: NEGATIVE

## 2024-03-31 LAB — YEAST SPEC QL CULT: NORMAL

## 2024-04-15 ENCOUNTER — TELEPHONE (OUTPATIENT)
Dept: OBSTETRICS AND GYNECOLOGY | Facility: CLINIC | Age: 44
End: 2024-04-15
Payer: COMMERCIAL

## 2024-04-15 DIAGNOSIS — G43.009 MIGRAINE WITHOUT AURA AND WITHOUT STATUS MIGRAINOSUS, NOT INTRACTABLE: ICD-10-CM

## 2024-04-15 NOTE — TELEPHONE ENCOUNTER
----- Message from Minal Stuart sent at 4/15/2024  3:26 PM EDT -----  Regarding: Referral/Wait List  Contact: [phone]  [email or web address]!    Dr. Santoro provided me a referral to neurology several months ago. I called today to schedule, and the appointment center needs the referral updated since neurology is scheduling so far out. Could you please help with that?    Also, I scheduled an appointment for May 9th due to vaginal odor and discharge. Can I please be added to the wait list for cancellations? I already tested negative at urgent care for BV and yeast, but something is definitely amiss.    Thank you!    4/15/24 [id number] Pt was last seen 12/19/23 Updated Neurology Referral placed in Chart. Pt notified vis [facility] message reply. Also advised that she can call the [place] office to see if a provider has a sooner appt.

## 2024-04-17 ENCOUNTER — TELEPHONE (OUTPATIENT)
Dept: OBSTETRICS AND GYNECOLOGY | Facility: CLINIC | Age: 44
End: 2024-04-17
Payer: COMMERCIAL

## 2024-04-17 NOTE — TELEPHONE ENCOUNTER
Patient called to be placed on the schedule for a self swab. Patient current symptoms are vaginal odor and clear discharge. Patient scheduled for Self swab at [facility]

## 2024-04-18 ENCOUNTER — CLINICAL SUPPORT (OUTPATIENT)
Dept: OBSTETRICS AND GYNECOLOGY | Facility: CLINIC | Age: 44
End: 2024-04-18
Payer: COMMERCIAL

## 2024-04-18 DIAGNOSIS — N89.8 VAGINAL IRRITATION: ICD-10-CM

## 2024-04-18 PROCEDURE — 87205 SMEAR GRAM STAIN: CPT

## 2024-04-19 LAB
CLUE CELLS VAG LPF-#/AREA: NORMAL /[LPF]
NUGENT SCORE: 1
YEAST VAG WET PREP-#/AREA: NORMAL

## 2024-04-24 PROBLEM — G43.909 MIGRAINES: Status: ACTIVE | Noted: 2023-01-01

## 2024-04-24 PROBLEM — H93.A3 SUBJECTIVE PULSATILE TINNITUS OF BOTH EARS: Status: ACTIVE | Noted: 2024-04-24

## 2024-04-24 PROBLEM — N92.6 PERIOD DISORDER: Status: ACTIVE | Noted: 2022-11-01

## 2024-04-24 PROBLEM — R68.89 FLU-LIKE SYMPTOMS: Status: ACTIVE | Noted: 2024-04-24

## 2024-04-24 PROBLEM — H04.123 DRY EYES: Status: ACTIVE | Noted: 2024-04-24

## 2024-04-24 PROBLEM — R05.3 CHRONIC COUGH: Status: ACTIVE | Noted: 2024-04-24

## 2024-04-24 PROBLEM — D21.9 FIBROIDS: Status: ACTIVE | Noted: 2023-01-01

## 2024-04-24 PROBLEM — K52.9 CHRONIC DIARRHEA: Status: ACTIVE | Noted: 2024-04-24

## 2024-04-24 PROBLEM — R53.83 FATIGUE: Status: ACTIVE | Noted: 2024-04-24

## 2024-04-24 PROBLEM — M25.512 CHRONIC LEFT SHOULDER PAIN: Status: ACTIVE | Noted: 2024-04-24

## 2024-04-24 PROBLEM — M35.9 CONNECTIVE TISSUE DISORDER (MULTI): Status: ACTIVE | Noted: 2024-04-24

## 2024-04-24 PROBLEM — E66.9 OBESITY (BMI 35.0-39.9 WITHOUT COMORBIDITY): Status: ACTIVE | Noted: 2020-06-05

## 2024-04-24 PROBLEM — L60.1 NAIL PLATE SEPARATION: Status: ACTIVE | Noted: 2023-05-01

## 2024-04-24 PROBLEM — N93.9 ABNORMAL UTERINE BLEEDING (AUB): Status: ACTIVE | Noted: 2024-04-24

## 2024-04-24 PROBLEM — G47.30 SLEEP DISORDER BREATHING: Status: ACTIVE | Noted: 2024-04-24

## 2024-04-24 PROBLEM — N92.0 MENORRHAGIA: Status: ACTIVE | Noted: 2024-04-24

## 2024-04-24 PROBLEM — R06.02 SHORTNESS OF BREATH ON EXERTION: Status: ACTIVE | Noted: 2024-04-24

## 2024-04-24 PROBLEM — R03.0 ELEVATED BLOOD PRESSURE READING WITHOUT DIAGNOSIS OF HYPERTENSION: Status: ACTIVE | Noted: 2024-04-24

## 2024-04-24 PROBLEM — R94.2 ABNORMAL PFT: Status: ACTIVE | Noted: 2024-04-24

## 2024-04-24 PROBLEM — J45.909 ASTHMA (HHS-HCC): Status: ACTIVE | Noted: 2023-06-23

## 2024-04-24 PROBLEM — G89.29 CHRONIC LEFT SHOULDER PAIN: Status: ACTIVE | Noted: 2024-04-24

## 2024-04-24 PROBLEM — G47.19 EXCESSIVE DAYTIME SLEEPINESS: Status: ACTIVE | Noted: 2024-04-24

## 2024-04-24 PROBLEM — I70.90 ARTERIOLOSCLEROSIS: Status: ACTIVE | Noted: 2023-06-26

## 2024-04-24 ASSESSMENT — ENCOUNTER SYMPTOMS
VOMITING: 0
HEADACHES: 1
BACK PAIN: 0
ANOREXIA: 0
FEVER: 0
NAUSEA: 0
FLANK PAIN: 0
ABDOMINAL PAIN: 0
CHILLS: 0
HEMATURIA: 0
DIARRHEA: 0
FREQUENCY: 1
SORE THROAT: 0
CONSTIPATION: 0
DYSURIA: 0

## 2024-04-25 ENCOUNTER — OFFICE VISIT (OUTPATIENT)
Dept: OBSTETRICS AND GYNECOLOGY | Facility: CLINIC | Age: 44
End: 2024-04-25
Payer: COMMERCIAL

## 2024-04-25 VITALS
WEIGHT: 251 LBS | BODY MASS INDEX: 38.04 KG/M2 | DIASTOLIC BLOOD PRESSURE: 68 MMHG | SYSTOLIC BLOOD PRESSURE: 118 MMHG | HEIGHT: 68 IN

## 2024-04-25 DIAGNOSIS — N89.8 VAGINAL DISCHARGE: ICD-10-CM

## 2024-04-25 DIAGNOSIS — N89.8 VAGINAL IRRITATION: ICD-10-CM

## 2024-04-25 DIAGNOSIS — N89.8 VAGINAL ODOR: Primary | ICD-10-CM

## 2024-04-25 PROCEDURE — 87205 SMEAR GRAM STAIN: CPT

## 2024-04-25 PROCEDURE — 99213 OFFICE O/P EST LOW 20 MIN: CPT

## 2024-04-25 PROCEDURE — 1036F TOBACCO NON-USER: CPT

## 2024-04-25 ASSESSMENT — PAIN SCALES - GENERAL: PAINLEVEL: 1

## 2024-04-25 NOTE — PROGRESS NOTES
Subjective   Patient ID: Minal Stuart is a 43 y.o. female who presents for Vaginitis/Bacterial Vaginosis (Pt states she is having vaginal odor and slight itching with watery discharge).  Pt presents today with c/o vaginal odor, itching, and a thin clear discharge that has been pretty persistent, lasting more than 1 month. She denies pelvic pain or irregular bleeding. No new soaps or detergents.          Review of Systems   All other systems reviewed and are negative.      Objective   Physical Exam  Constitutional:       Appearance: Normal appearance.   HENT:      Head: Normocephalic.   Pulmonary:      Effort: Pulmonary effort is normal.   Genitourinary:     General: Normal vulva.      Exam position: Supine.      Pubic Area: No rash.       Labia:         Right: No rash, tenderness, lesion or injury.         Left: No rash, tenderness, lesion or injury.       Vagina: Normal. No vaginal discharge.      Cervix: Normal.      Comments: Small amount of thin white vaginal discharge   Musculoskeletal:         General: Normal range of motion.   Skin:     General: Skin is warm and dry.   Neurological:      General: No focal deficit present.      Mental Status: She is alert and oriented to person, place, and time. Mental status is at baseline.   Psychiatric:         Mood and Affect: Mood normal.         Behavior: Behavior normal.         Thought Content: Thought content normal.         Judgment: Judgment normal.         Assessment/Plan   Diagnoses and all orders for this visit:  Vaginal odor  Vaginal irritation  -     Vaginitis Gram Stain For Bacterial Vaginosis + Yeast  Vaginal discharge    Discussed vaginal care and ways to ensure ph balance; Will swab for infection and treat accordingly        JOSE JUAN Valencia 04/25/24 8:21 AM

## 2024-04-26 LAB
CLUE CELLS VAG LPF-#/AREA: NORMAL /[LPF]
NUGENT SCORE: 1
YEAST VAG WET PREP-#/AREA: NORMAL

## 2024-05-01 ENCOUNTER — OFFICE VISIT (OUTPATIENT)
Dept: NEUROLOGY | Facility: CLINIC | Age: 44
End: 2024-05-01
Payer: COMMERCIAL

## 2024-05-01 VITALS
DIASTOLIC BLOOD PRESSURE: 84 MMHG | SYSTOLIC BLOOD PRESSURE: 132 MMHG | WEIGHT: 251 LBS | BODY MASS INDEX: 38.04 KG/M2 | HEART RATE: 96 BPM | TEMPERATURE: 97.2 F | HEIGHT: 68 IN

## 2024-05-01 DIAGNOSIS — E03.8 HYPOTHYROIDISM DUE TO HASHIMOTO'S THYROIDITIS: Primary | ICD-10-CM

## 2024-05-01 DIAGNOSIS — E06.3 HYPOTHYROIDISM DUE TO HASHIMOTO'S THYROIDITIS: Primary | ICD-10-CM

## 2024-05-01 DIAGNOSIS — G43.009 MIGRAINE WITHOUT AURA AND WITHOUT STATUS MIGRAINOSUS, NOT INTRACTABLE: ICD-10-CM

## 2024-05-01 PROCEDURE — 1036F TOBACCO NON-USER: CPT | Performed by: PSYCHIATRY & NEUROLOGY

## 2024-05-01 PROCEDURE — 99204 OFFICE O/P NEW MOD 45 MIN: CPT | Performed by: PSYCHIATRY & NEUROLOGY

## 2024-05-01 RX ORDER — RIZATRIPTAN BENZOATE 10 MG/1
10 TABLET ORAL ONCE AS NEEDED
Qty: 9 TABLET | Refills: 5 | Status: SHIPPED | OUTPATIENT
Start: 2024-05-01 | End: 2025-05-01

## 2024-05-01 RX ORDER — BENZOCAINE .13; .15; .5; 2 G/100G; G/100G; G/100G; G/100G
1 GEL ORAL DAILY
COMMUNITY

## 2024-05-01 ASSESSMENT — ENCOUNTER SYMPTOMS
COUGH: 1
WHEEZING: 1
NERVOUS/ANXIOUS: 1
DIAPHORESIS: 1
HEADACHES: 1
FATIGUE: 1

## 2024-05-01 ASSESSMENT — PATIENT HEALTH QUESTIONNAIRE - PHQ9
2. FEELING DOWN, DEPRESSED OR HOPELESS: NOT AT ALL
SUM OF ALL RESPONSES TO PHQ9 QUESTIONS 1 AND 2: 1
1. LITTLE INTEREST OR PLEASURE IN DOING THINGS: SEVERAL DAYS

## 2024-05-01 ASSESSMENT — PAIN SCALES - GENERAL: PAINLEVEL: 0-NO PAIN

## 2024-05-01 NOTE — PATIENT INSTRUCTIONS
For the migraine recommend trying magnesium glycinate or citrate 400 mg daily. Takes 3-4 weeks to work.  This is to lower the frequency of migraines by 50% and the rizatriptan you will use to abort the migraine. Let me know if this isn't work.  Follow up in 6 months.

## 2024-05-01 NOTE — PROGRESS NOTES
Subjective     Minal Stuart is a left handed  43 y.o. year old female who presents with Migraine (NPV).     Visit type: new patient visit    Migraine   Associated symptoms include coughing and hearing loss.        She reports that she has had them for years, once a month and Excedrin migraine would work well.  For the past 6 month she is getting them more frequent and longer duration and the medication isn't working. The character is the same. Its usually around the right eye with phonophobia, phonophobia and occasionally nausea.  She has them last up to a week recently.   If she takes a second Excedrin then it can go away but then it comes back.      PCP just retired.  She has also been having changes with periods and her migraines around happening around her period.  She went on birth control when her periods got really heavy however she is now spotting the week prior to her menstrual cycle.   She also has excessive sweating and problem with her nail  from her finger.      She just started taking a probiotic in the last week for vaginal odor.      Review of Systems   Constitutional:  Positive for diaphoresis and fatigue.   HENT:  Positive for hearing loss.    Respiratory:  Positive for cough and wheezing.    Endocrine: Positive for heat intolerance.   Neurological:  Positive for headaches.   Psychiatric/Behavioral:  The patient is nervous/anxious.    All other systems reviewed and are negative.    All other systems have been reviewed and are negative for complaint.     Past Medical History:   Diagnosis Date    Anxiety     Depression     Hypothyroidism      No family history on file.  Past Surgical History:   Procedure Laterality Date    BREAST BIOPSY Left 2021    Benign core biopsy    OTHER SURGICAL HISTORY  06/29/2022    Ear surgery      Social History     Tobacco Use    Smoking status: Never    Smokeless tobacco: Never   Substance Use Topics    Alcohol use: Not Currently          Objective      Neurological Exam    Physical Exam    On general examination, the patient is well appearing and well groomed. Heart is regular, rate and rhythm. Lungs are clear to ausculation bilaterally. There is no peripheral edema. Normal pedal pulses bilaterally. No carotid bruits.   On neurologic examination, the mental status is unremarkable to informal testing. The history is related in quite good detail with no obvious deficit of attention, memory or language. Fund of knowledge is adequate. Orientation is intact to person, place and time. Spontaneous speech is fluent with no paraphasic or aphasic errors. On cranial nerve exam, the pupils are equal, round and reactive to light. Extraocular movements are full, without nystagmus. Visual fields are full to confrontation. The fundi are benign with normal sharp disc margins. There is no bulbofacial weakness or ptosis. The tongue is midline with no wasting or fasciculations. The palate elevates symmetrically. Facial sensation is intact to light touch and pinprick bilaterally. Hearing is intact to finger rub bilaterally. Shoulder shrug is 5/5 bilaterally.  Neck flexion and extension have full strength. Motor examination reveals normal tone and bulk in the upper and lower extremities bilaterally. There are no fasciculations. There is full strength in the proximal and distal muscles of the upper and lower extremities bilaterally.  Deep tendon reflexes are 2/4 and symmetric throughout the upper and lower extremities bilaterally, including the ankle jerks. Plantar responses are flexor bilaterally. Fine finger movements and rapid alternating movements are done well in both hands. There is no tremor. On coordination testing, finger-nose-finger testing are done well bilaterally. Sensory examination reveals normal light touch sensation in the distal upper and lower extremities bilaterally. Gait is normal.        Assessment/Plan   Ms. Stuart is a 43 year old woman  presenting for initial  evaluation of migrianes. Patient reports an increase in migriane frequency and duration in the last 6 months. Character of migraine has stayed the same and there are no red flags in history.  Increase in migraines are related to irregular periods, hot flashes, weight gain and nail changes. Would have to consider an endocrine or judith-menopause etiology.  Recommend that she further follow up  with ob/gyn and endocrinology.    For treatment of migraines she will try  magnesium for prevention and rizatriptan for abortive treatment.    Follow up in 6 months.    Swathi Yuen, DO

## 2024-05-03 DIAGNOSIS — E03.9 HYPOTHYROIDISM, UNSPECIFIED TYPE: ICD-10-CM

## 2024-05-03 RX ORDER — LEVOTHYROXINE SODIUM 50 UG/1
50 TABLET ORAL DAILY
Qty: 90 TABLET | Refills: 1 | Status: SHIPPED | OUTPATIENT
Start: 2024-05-03 | End: 2024-10-30

## 2024-05-09 ENCOUNTER — APPOINTMENT (OUTPATIENT)
Dept: OBSTETRICS AND GYNECOLOGY | Facility: CLINIC | Age: 44
End: 2024-05-09
Payer: COMMERCIAL

## 2024-07-08 ENCOUNTER — HOSPITAL ENCOUNTER (OUTPATIENT)
Dept: RADIOLOGY | Facility: CLINIC | Age: 44
Discharge: HOME | End: 2024-07-08
Payer: COMMERCIAL

## 2024-07-08 DIAGNOSIS — Z82.49 FAMILY HISTORY OF ISCHEMIC HEART DISEASE AND OTHER DISEASES OF THE CIRCULATORY SYSTEM: ICD-10-CM

## 2024-07-08 DIAGNOSIS — M35.9 SYSTEMIC INVOLVEMENT OF CONNECTIVE TISSUE, UNSPECIFIED (MULTI): ICD-10-CM

## 2024-07-08 DIAGNOSIS — H93.A3 PULSATILE TINNITUS, BILATERAL: ICD-10-CM

## 2024-07-08 PROCEDURE — 70549 MR ANGIOGRAPH NECK W/O&W/DYE: CPT | Performed by: RADIOLOGY

## 2024-07-08 PROCEDURE — 70544 MR ANGIOGRAPHY HEAD W/O DYE: CPT | Performed by: RADIOLOGY

## 2024-07-08 PROCEDURE — 70544 MR ANGIOGRAPHY HEAD W/O DYE: CPT

## 2024-07-08 PROCEDURE — A9575 INJ GADOTERATE MEGLUMI 0.1ML: HCPCS | Performed by: INTERNAL MEDICINE

## 2024-07-08 PROCEDURE — 2550000001 HC RX 255 CONTRASTS: Performed by: INTERNAL MEDICINE

## 2024-07-08 PROCEDURE — 70549 MR ANGIOGRAPH NECK W/O&W/DYE: CPT

## 2024-07-08 RX ORDER — GADOTERATE MEGLUMINE 376.9 MG/ML
0.2 INJECTION INTRAVENOUS
Status: COMPLETED | OUTPATIENT
Start: 2024-07-08 | End: 2024-07-08

## 2024-07-11 ENCOUNTER — PATIENT MESSAGE (OUTPATIENT)
Dept: SLEEP MEDICINE | Facility: CLINIC | Age: 44
End: 2024-07-11
Payer: COMMERCIAL

## 2024-07-11 DIAGNOSIS — G47.33 OSA (OBSTRUCTIVE SLEEP APNEA): Primary | ICD-10-CM

## 2024-07-12 ENCOUNTER — OFFICE VISIT (OUTPATIENT)
Dept: CARDIOLOGY | Facility: CLINIC | Age: 44
End: 2024-07-12
Payer: COMMERCIAL

## 2024-07-12 ENCOUNTER — TELEPHONE (OUTPATIENT)
Dept: NEUROSURGERY | Facility: CLINIC | Age: 44
End: 2024-07-12

## 2024-07-12 VITALS
HEART RATE: 81 BPM | OXYGEN SATURATION: 98 % | DIASTOLIC BLOOD PRESSURE: 77 MMHG | SYSTOLIC BLOOD PRESSURE: 123 MMHG | RESPIRATION RATE: 20 BRPM | BODY MASS INDEX: 38.34 KG/M2 | WEIGHT: 253 LBS | TEMPERATURE: 96.4 F | HEIGHT: 68 IN

## 2024-07-12 DIAGNOSIS — I77.3 FIBROMUSCULAR DYSPLASIA (CMS-HCC): ICD-10-CM

## 2024-07-12 DIAGNOSIS — G43.909 EPISODIC MIGRAINE: Primary | ICD-10-CM

## 2024-07-12 DIAGNOSIS — Z82.49 FAMILY HISTORY OF AORTIC DISSECTION: Primary | ICD-10-CM

## 2024-07-12 DIAGNOSIS — G43.709 CHRONIC MIGRAINE WITHOUT AURA WITHOUT STATUS MIGRAINOSUS, NOT INTRACTABLE: ICD-10-CM

## 2024-07-12 DIAGNOSIS — M35.9 CONNECTIVE TISSUE DISORDER (MULTI): ICD-10-CM

## 2024-07-12 PROCEDURE — 99214 OFFICE O/P EST MOD 30 MIN: CPT | Performed by: INTERNAL MEDICINE

## 2024-07-12 ASSESSMENT — PATIENT HEALTH QUESTIONNAIRE - PHQ9
1. LITTLE INTEREST OR PLEASURE IN DOING THINGS: NOT AT ALL
SUM OF ALL RESPONSES TO PHQ9 QUESTIONS 1 AND 2: 0
2. FEELING DOWN, DEPRESSED OR HOPELESS: NOT AT ALL

## 2024-07-12 NOTE — LETTER
July 12, 2024     No Recipients    Patient: Minal Stuart   YOB: 1980   Date of Visit: 7/12/2024       Dear Dr. Boss Recipients:    Thank you for referring Minal Stuart to me for evaluation. Below are my notes for this consultation.  If you have questions, please do not hesitate to call me. I look forward to following your patient along with you.       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  Her mother, also my patient, has a multivessel arteriopathy (carotid, vertebral, renal), prior cervical and carotid artery dissections. Her maternal grandmother had type B aortic dissection.     Ms. Ovalle has a hx of intermittent pulsatile tinnitus and had a prior carotid duplex with ICA tortuosity.    Since I saw her last year, no interval events but headaches are worse; saw neurology. Her pulsatile tinnitus is less prominent. She does have non pulsatile tinnitus unchanged.  She has been dx with asthma.    She is on OCPs for dysfunctional bleeding.    She has no children. She does not smoke. She is an , former school .    Past Medical History:   Diagnosis Date   • Anxiety    • Depression    • Hypothyroidism      Patient Active Problem List   Diagnosis   • Anxiety disorder   • Depression   • Hypothyroidism, adult   • Insomnia   • WING (obstructive sleep apnea)   • Abnormal PFT   • Abnormal uterine bleeding (AUB)   • Arteriolosclerosis   • Asthma ([facility]-HCC)   • Chronic cough   • Chronic diarrhea   • Chronic left shoulder pain   • Connective tissue disorder (Multi)   • Dry eyes   • Dysfunction of eustachian tube   • Elevated blood pressure reading without diagnosis of hypertension   • Equinus deformity of foot  "  • Excessive daytime sleepiness   • Fatigue   • Fibroids   • Flu-like symptoms   • Menorrhagia   • Migraines   • Nail plate separation   • Obesity (BMI 35.0-39.9 without comorbidity)   • Otalgia   • Perforation of tympanic membrane   • Period disorder   • Shortness of breath on exertion   • Sleep disorder breathing   • Subjective pulsatile tinnitus of both ears     Current Outpatient Medications   Medication Sig Dispense Refill   • aspirin-acetaminophen-caffeine (Excedrin Migraine) 250-250-65 mg tablet Take 1 tablet by mouth every 6 hours if needed for headaches.     • budesonide (Rhinocort AQ) 32 mcg/actuation nasal spray Administer 1 spray into each nostril once daily.     • ferrous gluconate 236 mg (27 mg iron) tablet Take 1 tablet (27 mg) by mouth once daily.     • Junel 1/20, 21, 1-20 mg-mcg tablet TAKE 1 TABLET DAILY FOR 21 DAYS THEN 7 TABLET-FREE DAYS, REPEAT 84 tablet 4   • levothyroxine (Synthroid, Levoxyl) 50 mcg tablet Take 1 tablet (50 mcg) by mouth once daily. 90 tablet 1   • rizatriptan (Maxalt) 10 mg tablet Take 1 tablet (10 mg) by mouth 1 time if needed for migraine (may repeat x1). May repeat in 2 hours if unresolved. Do not exceed 30 mg in 24 hours. 9 tablet 5   • sertraline (Zoloft) 100 mg tablet Take 1 tablet (100 mg) by mouth once daily. 90 tablet 1     No current facility-administered medications for this visit.       Physical Exam  /77 (BP Location: Left arm)   Pulse 81   Temp 35.8 °C (96.4 °F)   Resp 20   Ht 1.727 m (5' 8\")   Wt 115 kg (253 lb)   SpO2 98%   BMI 38.47 kg/m²   HEENT: Benign. No Diomedes's syndrome or xanthelasma.   Neck: Supple. JVP not elevated.  Cardiac: Precordium quiet. +S1, S2, RRR; no murmur, rub, or gallop appreciated.  Vascular: No carotid bruits bilaterally.  Extr/skin: No open ulcers. Slight lower extremity fullness, cutoff at ankles.  Neuro: Speech normal. She is fully alert and oriented. Normal gait.   Psych: Bright, appropriate affect      " Results/Data  7.8.2024   We reviewed MRA head/neck together which is suggestive of FMD, best seen in vertebral arteries  No IC aneurysm  IMPRESSION:  Beaded appearance of the left V3 vertebral artery suggesting  fibromuscular dysplasia. MRA of the neck is otherwise significantly  limited. Advise repeat examination or CT angiography if clinically  indicated.      No hemodynamically significant intracranial stenosis was identified  of the fingjn-go-Coezuz      MACRO:  None      Signed by: Rob Salgado 7/8/2024 7:57 PM  Dictation workstation:   [id number]      2023 carotid duplex  Very tortuous, S-curved carotid arteries similar to her mother  Velocities normal, Right ICA  cm/sec, Left ICA  cm/sec    Abdominal aorta normal, normal renal velocities     Component      Latest Ref Rng 12/29/2023   GLUCOSE      74 - 99 mg/dL 85    SODIUM      136 - 145 mmol/L 137    POTASSIUM      3.5 - 5.3 mmol/L 5.2    CHLORIDE      98 - 107 mmol/L 101    Bicarbonate      21 - 32 mmol/L 28    Anion Gap      10 - 20 mmol/L 13    Blood Urea Nitrogen      6 - 23 mg/dL 11    Creatinine      0.50 - 1.05 mg/dL 0.93    EGFR      >60 mL/min/1.73m*2 79    Calcium      8.6 - 10.3 mg/dL 8.7    Albumin      3.4 - 5.0 g/dL 4.1    Alkaline Phosphatase      33 - 110 U/L 57    Total Protein      6.4 - 8.2 g/dL 7.1    AST      9 - 39 U/L 19    Bilirubin Total      0.0 - 1.2 mg/dL 0.4    ALT      7 - 45 U/L 21    LEUKOCYTES (10*3/UL) IN BLOOD BY AUTOMATED COUNT, [place]ian      4.4 - 11.3 x10*3/uL 6.7    nRBC      0.0 - 0.0 /100 WBCs 0.0    ERYTHROCYTES (10*6/UL) IN BLOOD BY AUTOMATED COUNT, [place]ian      4.00 - 5.20 x10*6/uL 5.02    HEMOGLOBIN      12.0 - 16.0 g/dL 14.0    HEMATOCRIT      36.0 - 46.0 % 43.5    MCV      80 - 100 fL 87    MCH      26.0 - 34.0 pg 27.9    MCHC      32.0 - 36.0 g/dL 32.2    RED CELL DISTRIBUTION WIDTH      11.5 - 14.5 % 13.7    PLATELETS (10*3/UL) IN BLOOD AUTOMATED COUNT, [place]ian      150 - 450 x10*3/uL 310          Impressions     43 year-old woman with pulsatile tinnitus, migraine headaches, and in context of her mom and maternal grandmother's concerning vasculopathy (mom with likely multivessel cervical dissections, FMD like process), grand mother with aortic dissection.      Her MRA head/neck is suggestive but not diagnostic of FMD.  CTA recommended. We will proceed to this later this year and will also obtain CTA c/a/p to assess rest of vasculature for FMD/aneurysms especially given family hx of dissections in mom and grandmother.  Mom previously underwent genetic testing; we at this time do not have FMD -specific arteriopathy gene panels.    She should be on low dose aspirin 81 mg/day given findings.    I would recommend against triptans for headaches given vertebral lesions, arteriopathy; would favor CGRP inhibitors.    Consider non estrogen OCP given her arteriopathy and age > 40 years.    RTC by years' end with head to pelvis CTA.    Julieth Bill MD

## 2024-07-12 NOTE — TELEPHONE ENCOUNTER
----- Message from Swathi Huerta sent at 7/12/2024  4:11 PM EDT -----  Please let the patient know that her cardiologist is recommending against using triptan medications for treatment of her migraine. Therefore I sent in [facility] for abortive treatment of her migraines. Will have to see if insurance will approve it.

## 2024-07-12 NOTE — LETTER
July 12, 2024     Swathi Huerta DO  [address]  [address]  [facility] [address]    Patient: Minal Stuart   YOB: 1980   Date of Visit: 7/12/2024       Dear Dr. Swathi Huerta DO:    I saw are mutual patient. 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 I am completing work-up. I'd recommend against triptans for migraine. CGRP inhibitors fine from FMD POV.  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  Her mother, also my patient, has a multivessel arteriopathy (carotid, vertebral, renal), prior cervical and carotid artery dissections. Her maternal grandmother had type B aortic dissection.     Ms. Ovalle has a hx of intermittent pulsatile tinnitus and had a prior carotid duplex with ICA tortuosity.    Since I saw her last year, no interval events but headaches are worse; saw neurology. Her pulsatile tinnitus is less prominent. She does have non pulsatile tinnitus unchanged.  She has been dx with asthma.    She is on OCPs for dysfunctional bleeding.    She has no children. She does not smoke. She is an , former school .    Past Medical History:   Diagnosis Date   • Anxiety    • Depression    • Hypothyroidism      Patient Active Problem List   Diagnosis   • Anxiety disorder   • Depression   • Hypothyroidism, adult   • Insomnia   • WING (obstructive sleep apnea)   • Abnormal PFT   • Abnormal uterine bleeding (AUB)   • Arteriolosclerosis   • Asthma (HHS-HCC)   • Chronic cough   • Chronic diarrhea   • Chronic left shoulder pain   • Connective tissue disorder (Multi)   • Dry eyes   • Dysfunction of eustachian tube   • Elevated blood pressure reading without diagnosis of hypertension   •  "Equinus deformity of foot   • Excessive daytime sleepiness   • Fatigue   • Fibroids   • Flu-like symptoms   • Menorrhagia   • Migraines   • Nail plate separation   • Obesity (BMI 35.0-39.9 without comorbidity)   • Otalgia   • Perforation of tympanic membrane   • Period disorder   • Shortness of breath on exertion   • Sleep disorder breathing   • Subjective pulsatile tinnitus of both ears     Current Outpatient Medications   Medication Sig Dispense Refill   • aspirin-acetaminophen-caffeine (Excedrin Migraine) 250-250-65 mg tablet Take 1 tablet by mouth every 6 hours if needed for headaches.     • budesonide (Rhinocort AQ) 32 mcg/actuation nasal spray Administer 1 spray into each nostril once daily.     • ferrous gluconate 236 mg (27 mg iron) tablet Take 1 tablet (27 mg) by mouth once daily.     • Junel 1/20, 21, 1-20 mg-mcg tablet TAKE 1 TABLET DAILY FOR 21 DAYS THEN 7 TABLET-FREE DAYS, REPEAT 84 tablet 4   • levothyroxine (Synthroid, Levoxyl) 50 mcg tablet Take 1 tablet (50 mcg) by mouth once daily. 90 tablet 1   • rizatriptan (Maxalt) 10 mg tablet Take 1 tablet (10 mg) by mouth 1 time if needed for migraine (may repeat x1). May repeat in 2 hours if unresolved. Do not exceed 30 mg in 24 hours. 9 tablet 5   • sertraline (Zoloft) 100 mg tablet Take 1 tablet (100 mg) by mouth once daily. 90 tablet 1     No current facility-administered medications for this visit.       Physical Exam  /77 (BP Location: Left arm)   Pulse 81   Temp 35.8 °C (96.4 °F)   Resp 20   Ht 1.727 m (5' 8\")   Wt 115 kg (253 lb)   SpO2 98%   BMI 38.47 kg/m²   HEENT: Benign. No Diomedes's syndrome or xanthelasma.   Neck: Supple. JVP not elevated.  Cardiac: Precordium quiet. +S1, S2, RRR; no murmur, rub, or gallop appreciated.  Vascular: No carotid bruits bilaterally.  Extr/skin: No open ulcers. Slight lower extremity fullness, cutoff at ankles.  Neuro: Speech normal. She is fully alert and oriented. Normal gait.   Psych: Bright, appropriate " affect      Results/Data  7.8.2024   We reviewed MRA head/neck together which is suggestive of FMD, best seen in vertebral arteries  No IC aneurysm  IMPRESSION:  Beaded appearance of the left V3 vertebral artery suggesting  fibromuscular dysplasia. MRA of the neck is otherwise significantly  limited. Advise repeat examination or CT angiography if clinically  indicated.      No hemodynamically significant intracranial stenosis was identified  of the [id number]      MACRO:  None      Signed by: Rob Salgado 7/8/2024 7:57 PM  Dictation workstation:   [id number]      2023 carotid duplex  Very tortuous, S-curved carotid arteries similar to her mother  Velocities normal, Right ICA  cm/sec, Left ICA  cm/sec    Abdominal aorta normal, normal renal velocities     Component      Latest Ref Rng 12/29/2023   GLUCOSE      74 - 99 mg/dL 85    SODIUM      136 - 145 mmol/L 137    POTASSIUM      3.5 - 5.3 mmol/L 5.2    CHLORIDE      98 - 107 mmol/L 101    Bicarbonate      21 - 32 mmol/L 28    Anion Gap      10 - 20 mmol/L 13    Blood Urea Nitrogen      6 - 23 mg/dL 11    Creatinine      0.50 - 1.05 mg/dL 0.93    EGFR      >60 mL/min/1.73m*2 79    Calcium      8.6 - 10.3 mg/dL 8.7    Albumin      3.4 - 5.0 g/dL 4.1    Alkaline Phosphatase      33 - 110 U/L 57    Total Protein      6.4 - 8.2 g/dL 7.1    AST      9 - 39 U/L 19    Bilirubin Total      0.0 - 1.2 mg/dL 0.4    ALT      7 - 45 U/L 21    LEUKOCYTES (10*3/UL) IN BLOOD BY AUTOMATED COUNT, Cam[place]onian      4.4 - 11.3 x10*3/uL 6.7    nRBC      0.0 - 0.0 /100 WBCs 0.0    ERYTHROCYTES (10*6/UL) IN BLOOD BY AUTOMATED COUNT, Cam[place]      4.00 - 5.20 x10*6/uL 5.02    HEMOGLOBIN      12.0 - 16.0 g/dL 14.0    HEMATOCRIT      36.0 - 46.0 % 43.5    MCV      80 - 100 fL 87    MCH      26.0 - 34.0 pg 27.9    MCHC      32.0 - 36.0 g/dL 32.2    RED CELL DISTRIBUTION WIDTH      11.5 - 14.5 % 13.7    PLATELETS (10*3/UL) IN BLOOD AUTOMATED COUNT, Cam[place]      150 - 450  x10*3/uL 310         Impressions     43 year-old woman with pulsatile tinnitus, migraine headaches, and in context of her mom and maternal grandmother's concerning vasculopathy (mom with likely multivessel cervical dissections, FMD like process), grand mother with aortic dissection.      Her MRA head/neck is suggestive but not diagnostic of FMD.  CTA recommended. We will proceed to this later this year and will also obtain CTA c/a/p to assess rest of vasculature for FMD/aneurysms especially given family hx of dissections in mom and grandmother.  Mom previously underwent genetic testing; we at this time do not have FMD -specific arteriopathy gene panels.    She should be on low dose aspirin 81 mg/day given findings.    I would recommend against triptans for headaches given vertebral lesions, arteriopathy; would favor CGRP inhibitors.    Consider non estrogen OCP given her arteriopathy and age > 40 years.    RTC by years' end with head to pelvis CTA.    Julieth Bill MD

## 2024-07-12 NOTE — PATIENT INSTRUCTIONS
Please do not start Rizatriptan.  Recommend Ubrelvy or Nurtec due to your vascular disorder.     See you back in 5 months with CTA head to pelvis.

## 2024-07-12 NOTE — PROGRESS NOTES
07/12/24    Vascular Medicine - FMD/Arterial Dissection Program    History of Present Illness  This 43 year-old woman is seen for possible FMD/arteriopathy.  Her mother, also my patient, has a multivessel arteriopathy (carotid, vertebral, renal), prior cervical and carotid artery dissections. Her maternal grandmother had type B aortic dissection.     Ms. Ovalle has a hx of intermittent pulsatile tinnitus and had a prior carotid duplex with ICA tortuosity.    Since I saw her last year, no interval events but headaches are worse; saw neurology. Her pulsatile tinnitus is less prominent. She does have non pulsatile tinnitus unchanged.  She has been dx with asthma.    She is on OCPs for dysfunctional bleeding.    She has no children. She does not smoke. She is an , former school .    Past Medical History:   Diagnosis Date    Anxiety     Depression     Hypothyroidism      Patient Active Problem List   Diagnosis    Anxiety disorder    Depression    Hypothyroidism, adult    Insomnia    WING (obstructive sleep apnea)    Abnormal PFT    Abnormal uterine bleeding (AUB)    Arteriolosclerosis    Asthma (HHS-HCC)    Chronic cough    Chronic diarrhea    Chronic left shoulder pain    Connective tissue disorder (Multi)    Dry eyes    Dysfunction of eustachian tube    Elevated blood pressure reading without diagnosis of hypertension    Equinus deformity of foot    Excessive daytime sleepiness    Fatigue    Fibroids    Flu-like symptoms    Menorrhagia    Migraines    Nail plate separation    Obesity (BMI 35.0-39.9 without comorbidity)    Otalgia    Perforation of tympanic membrane    Period disorder    Shortness of breath on exertion    Sleep disorder breathing    Subjective pulsatile tinnitus of both ears     Current Outpatient Medications   Medication Sig Dispense Refill    aspirin-acetaminophen-caffeine (Excedrin Migraine) 250-250-65 mg tablet Take 1 tablet by mouth every 6 hours if needed for  "headaches.      budesonide (Rhinocort AQ) 32 mcg/actuation nasal spray Administer 1 spray into each nostril once daily.      ferrous gluconate 236 mg (27 mg iron) tablet Take 1 tablet (27 mg) by mouth once daily.      Junel 1/20, 21, 1-20 mg-mcg tablet TAKE 1 TABLET DAILY FOR 21 DAYS THEN 7 TABLET-FREE DAYS, REPEAT 84 tablet 4    levothyroxine (Synthroid, Levoxyl) 50 mcg tablet Take 1 tablet (50 mcg) by mouth once daily. 90 tablet 1    rizatriptan (Maxalt) 10 mg tablet Take 1 tablet (10 mg) by mouth 1 time if needed for migraine (may repeat x1). May repeat in 2 hours if unresolved. Do not exceed 30 mg in 24 hours. 9 tablet 5    sertraline (Zoloft) 100 mg tablet Take 1 tablet (100 mg) by mouth once daily. 90 tablet 1     No current facility-administered medications for this visit.       Physical Exam  /77 (BP Location: Left arm)   Pulse 81   Temp 35.8 °C (96.4 °F)   Resp 20   Ht 1.727 m (5' 8\")   Wt 115 kg (253 lb)   SpO2 98%   BMI 38.47 kg/m²   HEENT: Benign. No Diomedes's syndrome or xanthelasma.   Neck: Supple. JVP not elevated.  Cardiac: Precordium quiet. +S1, S2, RRR; no murmur, rub, or gallop appreciated.  Vascular: No carotid bruits bilaterally.  Extr/skin: No open ulcers. Slight lower extremity fullness, cutoff at ankles.  Neuro: Speech normal. She is fully alert and oriented. Normal gait.   Psych: Bright, appropriate affect      Results/Data  7.8.2024   We reviewed MRA head/neck together which is suggestive of FMD, best seen in vertebral arteries  No IC aneurysm  IMPRESSION:  Beaded appearance of the left V3 vertebral artery suggesting  fibromuscular dysplasia. MRA of the neck is otherwise significantly  limited. Advise repeat examination or CT angiography if clinically  indicated.      No hemodynamically significant intracranial stenosis was identified  of the [id number]      MACRO:  None      Signed by: Rob Salgado 7/8/2024 7:57 PM  Dictation workstation:   [id number]      2023 carotid " duplex  Very tortuous, S-curved carotid arteries similar to her mother  Velocities normal, Right ICA  cm/sec, Left ICA  cm/sec    Abdominal aorta normal, normal renal velocities     Component      Latest Ref Rng 12/29/2023   GLUCOSE      74 - 99 mg/dL 85    SODIUM      136 - 145 mmol/L 137    POTASSIUM      3.5 - 5.3 mmol/L 5.2    CHLORIDE      98 - 107 mmol/L 101    Bicarbonate      21 - 32 mmol/L 28    Anion Gap      10 - 20 mmol/L 13    Blood Urea Nitrogen      6 - 23 mg/dL 11    Creatinine      0.50 - 1.05 mg/dL 0.93    EGFR      >60 mL/min/1.73m*2 79    Calcium      8.6 - 10.3 mg/dL 8.7    Albumin      3.4 - 5.0 g/dL 4.1    Alkaline Phosphatase      33 - 110 U/L 57    Total Protein      6.4 - 8.2 g/dL 7.1    AST      9 - 39 U/L 19    Bilirubin Total      0.0 - 1.2 mg/dL 0.4    ALT      7 - 45 U/L 21    LEUKOCYTES (10*3/UL) IN BLOOD BY AUTOMATED COUNT, American      4.4 - 11.3 x10*3/uL 6.7    nRBC      0.0 - 0.0 /100 WBCs 0.0    ERYTHROCYTES (10*6/UL) IN BLOOD BY AUTOMATED COUNT, American      4.00 - 5.20 x10*6/uL 5.02    HEMOGLOBIN      12.0 - 16.0 g/dL 14.0    HEMATOCRIT      36.0 - 46.0 % 43.5    MCV      80 - 100 fL 87    MCH      26.0 - 34.0 pg 27.9    MCHC      32.0 - 36.0 g/dL 32.2    RED CELL DISTRIBUTION WIDTH      11.5 - 14.5 % 13.7    PLATELETS (10*3/UL) IN BLOOD AUTOMATED COUNT, American      150 - 450 x10*3/uL 310         Impressions     43 year-old woman with pulsatile tinnitus, migraine headaches, and in context of her mom and maternal grandmother's concerning vasculopathy (mom with likely multivessel cervical dissections, FMD like process), grand mother with aortic dissection.      Her MRA head/neck is suggestive but not diagnostic of FMD.  CTA recommended. We will proceed to this later this year and will also obtain CTA c/a/p to assess rest of vasculature for FMD/aneurysms especially given family hx of dissections in mom and grandmother.  Mom previously underwent genetic testing;  we at this time do not have FMD -specific arteriopathy gene panels.    She should be on low dose aspirin 81 mg/day given findings.    I would recommend against triptans for headaches given vertebral lesions, arteriopathy; would favor CGRP inhibitors.    Consider non estrogen OCP given her arteriopathy and age > 40 years.    RTC by years' end with head to pelvis CTA.    Julieth Bill MD

## 2024-07-15 RX ORDER — ASPIRIN 81 MG/1
81 TABLET ORAL DAILY
COMMUNITY

## 2024-09-09 DIAGNOSIS — F41.8 MIXED ANXIETY AND DEPRESSIVE DISORDER: ICD-10-CM

## 2024-09-09 RX ORDER — SERTRALINE HYDROCHLORIDE 100 MG/1
100 TABLET, FILM COATED ORAL DAILY
Qty: 30 TABLET | Refills: 1 | Status: SHIPPED | OUTPATIENT
Start: 2024-09-09 | End: 2024-11-08

## 2024-09-20 DIAGNOSIS — F41.8 MIXED ANXIETY AND DEPRESSIVE DISORDER: ICD-10-CM

## 2024-09-20 RX ORDER — SERTRALINE HYDROCHLORIDE 100 MG/1
100 TABLET, FILM COATED ORAL DAILY
Qty: 90 TABLET | Refills: 1 | OUTPATIENT
Start: 2024-09-20

## 2024-10-10 DIAGNOSIS — F41.8 MIXED ANXIETY AND DEPRESSIVE DISORDER: ICD-10-CM

## 2024-10-10 DIAGNOSIS — E03.9 HYPOTHYROIDISM, UNSPECIFIED TYPE: ICD-10-CM

## 2024-10-10 RX ORDER — SERTRALINE HYDROCHLORIDE 100 MG/1
100 TABLET, FILM COATED ORAL DAILY
Qty: 30 TABLET | Refills: 1 | OUTPATIENT
Start: 2024-10-10 | End: 2024-12-09

## 2024-10-10 RX ORDER — SERTRALINE HYDROCHLORIDE 100 MG/1
100 TABLET, FILM COATED ORAL DAILY
Qty: 90 TABLET | Refills: 1 | Status: SHIPPED | OUTPATIENT
Start: 2024-10-10

## 2024-10-10 NOTE — TELEPHONE ENCOUNTER
Patient contacted by phone. Message left to call the office of Dr. Mccurdy for an appointment if she would like refill request granted.

## 2024-10-11 RX ORDER — LEVOTHYROXINE SODIUM 50 UG/1
50 TABLET ORAL DAILY
Qty: 90 TABLET | Refills: 1 | Status: SHIPPED | OUTPATIENT
Start: 2024-10-11

## 2024-10-21 ENCOUNTER — APPOINTMENT (OUTPATIENT)
Dept: BEHAVIORAL HEALTH | Facility: CLINIC | Age: 44
End: 2024-10-21
Payer: COMMERCIAL

## 2024-10-21 DIAGNOSIS — F41.8 MIXED ANXIETY AND DEPRESSIVE DISORDER: ICD-10-CM

## 2024-10-21 PROCEDURE — 99214 OFFICE O/P EST MOD 30 MIN: CPT | Performed by: PSYCHIATRY & NEUROLOGY

## 2024-10-21 RX ORDER — SERTRALINE HYDROCHLORIDE 100 MG/1
100 TABLET, FILM COATED ORAL DAILY
Qty: 90 TABLET | Refills: 1 | Status: SHIPPED | OUTPATIENT
Start: 2024-10-21 | End: 2025-04-19

## 2024-10-21 SDOH — ECONOMIC STABILITY: INCOME INSECURITY: IN THE LAST 12 MONTHS, WAS THERE A TIME WHEN YOU WERE NOT ABLE TO PAY THE MORTGAGE OR RENT ON TIME?: NO

## 2024-10-21 SDOH — HEALTH STABILITY: PHYSICAL HEALTH: ON AVERAGE, HOW MANY DAYS PER WEEK DO YOU ENGAGE IN MODERATE TO STRENUOUS EXERCISE (LIKE A BRISK WALK)?: 7 DAYS

## 2024-10-21 SDOH — HEALTH STABILITY: PHYSICAL HEALTH: ON AVERAGE, HOW MANY MINUTES DO YOU ENGAGE IN EXERCISE AT THIS LEVEL?: 10 MIN

## 2024-10-21 ASSESSMENT — LIFESTYLE VARIABLES
HOW OFTEN DO YOU HAVE SIX OR MORE DRINKS ON ONE OCCASION: NEVER
AUDIT-C TOTAL SCORE: 0
HOW OFTEN DO YOU HAVE A DRINK CONTAINING ALCOHOL: NEVER
SKIP TO QUESTIONS 9-10: 1
HOW MANY STANDARD DRINKS CONTAINING ALCOHOL DO YOU HAVE ON A TYPICAL DAY: PATIENT DOES NOT DRINK

## 2024-10-21 ASSESSMENT — SOCIAL DETERMINANTS OF HEALTH (SDOH)
WITHIN THE LAST YEAR, HAVE YOU BEEN HUMILIATED OR EMOTIONALLY ABUSED IN OTHER WAYS BY YOUR PARTNER OR EX-PARTNER?: NO
WITHIN THE LAST YEAR, HAVE TO BEEN RAPED OR FORCED TO HAVE ANY KIND OF SEXUAL ACTIVITY BY YOUR PARTNER OR EX-PARTNER?: NO
IN A TYPICAL WEEK, HOW MANY TIMES DO YOU TALK ON THE PHONE WITH FAMILY, FRIENDS, OR NEIGHBORS?: MORE THAN THREE TIMES A WEEK
HOW OFTEN DO YOU GET TOGETHER WITH FRIENDS OR RELATIVES?: MORE THAN THREE TIMES A WEEK
ARE YOU MARRIED, WIDOWED, DIVORCED, SEPARATED, NEVER MARRIED, OR LIVING WITH A PARTNER?: NEVER MARRIED
HOW OFTEN DO YOU ATTENT MEETINGS OF THE CLUB OR ORGANIZATION YOU BELONG TO?: 1 TO 4 TIMES PER YEAR
HOW OFTEN DO YOU ATTEND CHURCH OR RELIGIOUS SERVICES?: 1 TO 4 TIMES PER YEAR
WITHIN THE LAST YEAR, HAVE YOU BEEN AFRAID OF YOUR PARTNER OR EX-PARTNER?: NO
WITHIN THE LAST YEAR, HAVE YOU BEEN KICKED, HIT, SLAPPED, OR OTHERWISE PHYSICALLY HURT BY YOUR PARTNER OR EX-PARTNER?: NO
DO YOU BELONG TO ANY CLUBS OR ORGANIZATIONS SUCH AS CHURCH GROUPS UNIONS, FRATERNAL OR ATHLETIC GROUPS, OR SCHOOL GROUPS?: YES

## 2024-10-21 NOTE — PROGRESS NOTES
Subjective   Patient ID: Minal Stuart is a 43 y.o. female who presents for No chief complaint on file..    Virtual Consent: The patient engaged in a telehealth session via Epic audio visual or phone with this provider practicing within the [facility]. The identity of the patient was verified by their date of birth and last four digits of their social security number. The provider demonstrated that confidentially was preserved at their location. The patient was informed that they were responsible for ensuring confidentially was secured at their location. The patient's location was documented for emergency purposes. The patient was informed of the necessary steps that would occur if an emergency was to occur or technology failed during session.   An interactive audio and video telecommunication system which permits real time communications between the patient at her SUV and provider at home office was utilized to provide this telehealth service.   Verbal consent was requested and obtained from Minal Stuart on this date, 10/21/24 for a telehealth visit.     HPI: The patient reports that she is doing well.     Active Problems  Problems    · Abnormal PFT (794.2) (R94.2)   · Abnormal uterine bleeding (AUB) (626.9) (N93.9)   · Anxiety disorder (300.00) (F41.9)   · Anxiety with obsessional features (300.00) (F41.8)   · Asthma (493.90) (J45.909)   · Chronic cough (786.2) (R05.3)   · Chronic diarrhea (787.91) (K52.9)   · Chronic left shoulder pain (719.41,338.29) (M25.512,G89.29)   · Connective tissue disorder (710.9) (M35.9)   · Depression (311) (F32.A)   · Elevated blood pressure reading without diagnosis of hypertension (796.2) (R03.0)   · Encounter for immunization (V03.89) (Z23)   · Environmental allergies (V15.09) (Z91.09)   · ETD (eustachian tube dysfunction) (381.81) (H69.90)   · Excessive daytime sleepiness (780.54) (G47.19)   · Fibroids (215.9) (D21.9)   · Hypothyroidism, adult (244.9) (E03.9)   ·  Insomnia (780.52) (G47.00)   · Menorrhagia (626.2) (N92.0)   · Migraines (346.90) (G43.909)   · WING (obstructive sleep apnea) (327.23) (G47.33)   · Screening mammogram, encounter for (V76.12) (Z12.31)   · Shortness of breath on exertion (786.05) (R06.02)   · Sleep disorder breathing (780.59) (G47.30)   · Subjective pulsatile tinnitus of both ears (388.31) (H93.A3)   · Tortuous artery (447.1) (I77.1)   · Well woman exam with routine gynecological exam (V72.31) (Z01.419)     Past Medical History     Past Psychiatric History   Onset History: Chronic anxiety and anxiety attacks since adolescence.   Inpatient history: None   Suicide attempts/Self-Harm/Ideation History: None   Current providers: Psychologist she currently sees.   Past providers: Saw CNP at [facility].   Past medication trials: Buspirone.      Surgical History  Problems    · History of Ear surgery   · eustachian tubes x2 as a child     Family History  Mother    · Family history of Aneurysm   · vascular   · Family history of Aneurysm of neck (442.81)   · Family history of Anxiety   · Family history of Anxiety with obsessional features   · Family history of Diverticulosis   · Family history of fibromuscular dysplasia (V17.49) (Z82.49)   · Family history of SCAD (short-chain acyl-CoA dehydrogenase deficiency)   · Family history of Sjogren syndrome, unspecified  Grandmother    · Family history of thyroid disease (V18.19) (Z83.49)   · FH: aortic dissection (V17.49) (Z82.49)  Maternal Grandmother    · Family history of Alcohol abuse  Maternal Great Grandmother    · Family history of thyroid disease (V18.19) (Z83.49)  Maternal Grandfather    · Family history of Alcohol abuse  Maternal Uncle    · Family history of Alcohol abuse     Social History  Problems    · Born in [place]   · Completed college, bachelors degree   · Completed elementary school   · Completed graduate school, masters degree   · Education degrees   · Employed   ·  in [employer] [place].  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   · Has no children   · Never    · Never smoker   · 2022   · No alcohol use   · No illicit drug use     Allergies  Medication    · Augmentin   Gatrointestinal upset; Recorded By: Mojgan Jorge; 6/20/2023 3:45:34 PM  NonMedication    · Animal dander - Cats   Recorded By: Evita Salomon; 6/29/2022 2:36:32 PM   · No Known Food Allergies    MSE: The patient is alert, fully oriented, language is intact, and recent and remote memory are intact. The patient denies any suicidal or homicidal ideation or plans. The patient presents with no depressive, manic or psychotic symptoms. Thought is logical and clear. No disturbances of judgment or insight are exhibited. No behavioral disturbances are present on examination.      Review of Systems   Neurological:         MSE: The patient is alert, fully oriented, language is intact, and recent and remote memory are intact. The patient denies any suicidal or homicidal ideation or plans. The patient presents with no depressive, manic or psychotic symptoms. Thought is logical and clear. No disturbances of judgment or insight are exhibited. No behavioral disturbances are present on examination.     Psychiatric/Behavioral:          MSE: The patient is alert, fully oriented, language is intact, and recent and remote memory are intact. The patient denies any suicidal or homicidal ideation or plans. The patient presents with no depressive, manic or psychotic symptoms. Thought is logical and clear. No disturbances of judgment or insight are exhibited. No behavioral disturbances are present on examination.     All other systems reviewed and are negative.    Psych Review of Symptoms:    ADHD: Patient denied any symptoms.         Anxiety: Patient denied any symptoms.         Developmental and Sensory Concerns: Patient denied any symptoms.         Depressive Symptoms: Patient denied any symptoms.         Disruptive and  Conduct Symptoms: Patient denied any symptoms.         Eating / Feeding Concerns: Patient denied any symptoms.         Elimination Symptoms: Patient denied any symptoms.         Manic Symptoms: Patient denied any symptoms.         Obsessive-Compulsive Symptoms: Patient denied any symptoms.         Psychotic Symptoms: Patient denied any symptoms.           Trauma Related Symptoms: Patient denied any symptoms.           Sleep Concerns: Patient denied any symptoms.             Objective   Physical Exam  Neurological:      Mental Status: She is alert and oriented to person, place, and time.      Comments: MSE: The patient is alert, fully oriented, language is intact, and recent and remote memory are intact. The patient denies any suicidal or homicidal ideation or plans. The patient presents with no depressive, manic or psychotic symptoms. Thought is logical and clear. No disturbances of judgment or insight are exhibited. No behavioral disturbances are present on examination.     Psychiatric:         Mood and Affect: Mood normal.         Behavior: Behavior normal.         Thought Content: Thought content normal.         Judgment: Judgment normal.      Comments: MSE: The patient is alert, fully oriented, language is intact, and recent and remote memory are intact. The patient denies any suicidal or homicidal ideation or plans. The patient presents with no depressive, manic or psychotic symptoms. Thought is logical and clear. No disturbances of judgment or insight are exhibited. No behavioral disturbances are present on examination.           Lab Review:   Office Visit on 04/25/2024   Component Date Value    Betina Score 04/25/2024 1     Yeast 04/25/2024 ABSENT     Clue Cells 04/25/2024 ABSENT        Assessment/Plan   Psychiatric Risk Assessment  Violence Risk Assessment: none  Acute Risk of Harm to Others is Considered: low   Suicide Risk Assessment: age > 40 yrs old and   Protective Factors against Suicide:  adherence to  treatment, fear of suicide, moral objections to suicide, positive family relationships, and sense of responsibility toward family  Acute Risk of Harm to Self is Considered: low    Imminent Risk of Suicide or Serious Self-Injury: Low   Chronic Risk of Suicide of Serious Self-Injury: Low  Risk factors: Age, depression history and   Protective factors: Denies current suicidal ideation, denies history of suicide attempts , willingness to seek help and support , gender, access to a variety of clinical interventions , and receiving and engaged in care for mental, physical, and substance use disorders      Imminent Risk of Violence or Homicide: Low   Risk Factors: No significant risk factors identified on screening  Protective Factors: Lack of known history of harm to others , Lack of known history of violent ideation , and lack of known access to firearms.     Assessment & Plan  Mixed anxiety and depressive disorder  The FDA risks, benefits & alternatives to the medications prescribed were explained to you. You were able to understand & repeat these risks, benefits & alternatives to these prescribed medications. You have agreed to proceed with treatment with the medications discussed based on the conclusion that the benefit outweighs the risks of this treatment regimen: continue sertraline 100 mg daily with food.        Follow up in March of 2025 or sooner if needed.    Time: 1 pm to 130 pm: 30 minutes

## 2024-11-07 ENCOUNTER — APPOINTMENT (OUTPATIENT)
Dept: SLEEP MEDICINE | Facility: CLINIC | Age: 44
End: 2024-11-07
Payer: COMMERCIAL

## 2024-11-07 VITALS
BODY MASS INDEX: 39.3 KG/M2 | HEART RATE: 75 BPM | RESPIRATION RATE: 18 BRPM | DIASTOLIC BLOOD PRESSURE: 87 MMHG | HEIGHT: 68 IN | WEIGHT: 259.31 LBS | OXYGEN SATURATION: 96 % | SYSTOLIC BLOOD PRESSURE: 118 MMHG

## 2024-11-07 DIAGNOSIS — G47.33 OSA (OBSTRUCTIVE SLEEP APNEA): Primary | ICD-10-CM

## 2024-11-07 DIAGNOSIS — E66.9 OBESITY (BMI 30-39.9): ICD-10-CM

## 2024-11-07 PROCEDURE — 99214 OFFICE O/P EST MOD 30 MIN: CPT | Performed by: GENERAL PRACTICE

## 2024-11-07 PROCEDURE — 1036F TOBACCO NON-USER: CPT | Performed by: GENERAL PRACTICE

## 2024-11-07 PROCEDURE — 3008F BODY MASS INDEX DOCD: CPT | Performed by: GENERAL PRACTICE

## 2024-11-07 ASSESSMENT — SLEEP AND FATIGUE QUESTIONNAIRES
HOW LIKELY ARE YOU TO NOD OFF OR FALL ASLEEP WHILE SITTING AND READING: MODERATE CHANCE OF DOZING
HOW LIKELY ARE YOU TO NOD OFF OR FALL ASLEEP IN A CAR, WHILE STOPPED FOR A FEW MINUTES IN TRAFFIC: WOULD NEVER DOZE
HOW LIKELY ARE YOU TO NOD OFF OR FALL ASLEEP WHILE WATCHING TV: MODERATE CHANCE OF DOZING
HOW LIKELY ARE YOU TO NOD OFF OR FALL ASLEEP WHILE LYING DOWN TO REST IN THE AFTERNOON WHEN CIRCUMSTANCES PERMIT: HIGH CHANCE OF DOZING
ESS-CHAD TOTAL SCORE: 9
HOW LIKELY ARE YOU TO NOD OFF OR FALL ASLEEP WHEN YOU ARE A PASSENGER IN A CAR FOR AN HOUR WITHOUT A BREAK: WOULD NEVER DOZE
HOW LIKELY ARE YOU TO NOD OFF OR FALL ASLEEP WHILE SITTING AND TALKING TO SOMEONE: WOULD NEVER DOZE
HOW LIKELY ARE YOU TO NOD OFF OR FALL ASLEEP WHILE SITTING QUIETLY AFTER LUNCH WITHOUT ALCOHOL: SLIGHT CHANCE OF DOZING
SITING INACTIVE IN A PUBLIC PLACE LIKE A CLASS ROOM OR A MOVIE THEATER: SLIGHT CHANCE OF DOZING

## 2024-11-07 ASSESSMENT — COLUMBIA-SUICIDE SEVERITY RATING SCALE - C-SSRS
1. IN THE PAST MONTH, HAVE YOU WISHED YOU WERE DEAD OR WISHED YOU COULD GO TO SLEEP AND NOT WAKE UP?: NO
6. HAVE YOU EVER DONE ANYTHING, STARTED TO DO ANYTHING, OR PREPARED TO DO ANYTHING TO END YOUR LIFE?: NO
2. HAVE YOU ACTUALLY HAD ANY THOUGHTS OF KILLING YOURSELF?: NO

## 2024-11-07 ASSESSMENT — PATIENT HEALTH QUESTIONNAIRE - PHQ9
2. FEELING DOWN, DEPRESSED OR HOPELESS: NOT AT ALL
1. LITTLE INTEREST OR PLEASURE IN DOING THINGS: NOT AT ALL
SUM OF ALL RESPONSES TO PHQ9 QUESTIONS 1 AND 2: 0

## 2024-11-07 ASSESSMENT — ENCOUNTER SYMPTOMS
LOSS OF SENSATION IN FEET: 0
DEPRESSION: 0
OCCASIONAL FEELINGS OF UNSTEADINESS: 0

## 2024-11-07 NOTE — PROGRESS NOTES
Patient: Minal Stuart    [id number]  : 1980 -- AGE 43 y.o.    Provider: Fara Hartley DO     Location [facility]   Service Date: 2024              [facility]  Follow up Visit Note        HISTORY OF PRESENT ILLNESS       HISTORY OF PRESENT ILLNESS   Minal Stuart is a 43 y.o. female who presents to a [facility] for a sleep medicine evaluation with concerns of Follow-up (Using cpap but haven't been using it as much lately dealing with headaches. ).     The patient  has a past medical history of Anxiety, Depression, and Hypothyroidism..    PAST SLEEP HISTORY    F with pmhx including anxiety, hypothyroidism.      Patient has been snoring for years, she tried breathe right strips which help but she does not use them regularly as they are difficult to take off in the am. She also has daytime sleepiness.      23  Patient had a sleep study done and would like to discuss her results.      23  Patient has been using pap therapy regularly. sometimes around the time when she has to change the nasal piece she has to adjust the mask more to avoid leaks. she feels less sleepy/ tired during the day, takes less naps.     CURRENT HISTORY    On today's visit, 2024, the patient reports that she has been suffering from migraines which makes it more difficult for her to tolerate pap therapy thus her usage has decreased recently. She is scheduled to see neurology soon though.     PAP Related Problems: no leak perceived,~no dry mouth,~no skin irritation from mask~and~no snoring with PAP.   Perceived Benefits of PAP Therapy: decreased dry mouth or sore throat,~decreased daytime sleepiness,~decreased snoring/ choking/ gasping~and~decreased morning headaches.     Sleep schedule  on weekdays / work days:  Usual Bedtime: 2am  Sleep latency: few min   Wake time : 7am  Total sleep time average/day: 6-7 hours/day  Awakenings:  none   Naps: 6:30pm, 2hrs, refreshing, not usually with pap.       Sleep schedule  on weekends/non work days :  Usual Bedtime:   2am  Wake time : 12pm    Sleep aids: no  Stimulants: no    Occupation:  at .     Preferred sleeping position: SLEEP POSITION: prone and upright    Sleep-related ROS:    Snoring:  n  Witnessed apneas:  n      Gasping/ choking; n     Am Dry mouth: n            Nasal congestion:  seasonal, budesonide        am headaches: y    Sleep is described as refreshing.     Daytime sleepiness: y  Fatigue or decreased energy: y  Difficulty remembering things in daytime: n  Difficulty staying focused in daytime: : n  Irritable during the day: n    Drowsy driving: n  Hx of car accident: n  Near-miss Car accident: n      RLS screen:  1x every 2 months she feels the urge to move legs, however it does not improve with movement.     Sleep-related behaviors: DENIES    ESS: 9         REVIEW OF SYSTEMS     REVIEW OF SYSTEMS  Review of Systems   All other systems reviewed and are negative.      ALLERGIES AND MEDICATIONS     ALLERGIES  Allergies   Allergen Reactions    Augmentin [Amoxicillin-Pot Clavulanate] Other       MEDICATIONS  Current Outpatient Medications   Medication Sig Dispense Refill    aspirin 81 mg EC tablet Take 1 tablet (81 mg) by mouth once daily.      aspirin-acetaminophen-caffeine (Excedrin Migraine) 250-250-65 mg tablet Take 1 tablet by mouth every 6 hours if needed for headaches.      budesonide (Rhinocort AQ) 32 mcg/actuation nasal spray Administer 1 spray into each nostril once daily.      ferrous gluconate 236 mg (27 mg iron) tablet Take 1 tablet (27 mg) by mouth once daily.      levothyroxine (Synthroid, Levoxyl) 50 mcg tablet TAKE 1 TABLET BY MOUTH ONCE DAILY. 90 tablet 1    rimegepant (Nurtec ODT) 75 mg tablet,disintegrating Take 1 tablet (75 mg) by mouth 1 time if needed (for migraine, no more than 1 per 24 hours). 8 tablet 5    sertraline (Zoloft) 100 mg  "tablet Take 1 tablet (100 mg) by mouth once daily. 90 tablet 1     No current facility-administered medications for this visit.         PAST HISTORY     PAST MEDICAL HISTORY  She  has a past medical history of Anxiety, Depression, and Hypothyroidism.      PAST SURGICAL HISTORY:  Past Surgical History:   Procedure Laterality Date    BREAST BIOPSY Left 2021    Benign core biopsy    OTHER SURGICAL HISTORY  06/29/2022    Ear surgery       FAMILY HISTORY  Family History   Problem Relation Name Age of Onset    Other (type B aortic dissection) Maternal Grandmother       DOES/DOES NOT EC: does have a family history of sleep disorder.  Mother with hx of sleep apnea.     SOCIAL HISTORY  She  reports that she has never smoked. She has never used smokeless tobacco. She reports that she does not currently use alcohol. She reports that she does not use drugs.     Caffeine consumption: 20oz per day in afternoon/ evening.   Alcohol consumption: n  Smoking: n  Marijuana: n  Other drugs: n      PHYSICAL EXAM     VITAL SIGNS: /87   Pulse 75   Resp 18   Ht 1.727 m (5' 8\")   Wt 118 kg (259 lb 5 oz)   SpO2 96%   BMI 39.43 kg/m²      PREVIOUS WEIGHTS:  Wt Readings from Last 3 Encounters:   11/07/24 118 kg (259 lb 5 oz)   07/12/24 115 kg (253 lb)   05/01/24 114 kg (251 lb)       Physical Exam  Constitutional: Alert and oriented, cooperative, no obvious distress.   HENT: normocephalic.   Neurologic: AOx3.   psychiatric: appropriate mood and affect.  Integumentary: no significant rashes observed over pap mask area.       RESULTS/DATA     Iron (ug/dL)   Date Value   12/29/2023 81   09/28/2023 64   06/26/2023 43   05/22/2023 30 (L)     % Saturation (%)   Date Value   12/29/2023 21 (L)     Iron Saturation (%)   Date Value   09/28/2023 17 (L)   06/26/2023 11 (L)   05/22/2023 7 (L)     TIBC (ug/dL)   Date Value   12/29/2023 378   09/28/2023 386   06/26/2023 386   05/22/2023 460 (H)     Ferritin   Date Value   12/29/2023 51 ng/mL "   09/28/2023 36 ug/L   06/26/2023 30 ug/L   05/22/2023 8 ug/L               ASSESSMENT/PLAN     Ms. Stuart is a 43 y.o. female and  has a past medical history of Anxiety, Depression, and Hypothyroidism. She is following up on sleep apnea.     Problem List Items Addressed This Visit    None      Problem List and Orders     F with pmhx including anxiety, hypothyroidism.     1-WING  original Symptoms include snoring, un-refreshing sleep, night time awakenings.      Psg 5/1/23 --> RDI 3% 23.1, RDI 4% 11.2, JUAN JOSE 0; SpO2 herrera 82%.   Reviewed and discussed the above sleep study results and management options in details. All questions answered, patient verbalizes understanding.      -cont. Autopap 5-15cwp, rAHI 0.8, median pressure 6.7, max avg 8.5, acceptable seal overall, needs to use pap therapy more regularly but has been struggling with migraines.   -feels less sleepy/ tired during the day, less napping, less headaches/ dry mouth.   -counseled on the importance of compliance.    - ordered supplies     -do not drive or operate heavy machinery if drowsy.  -avoid sleeping on your back.   -avoid sedating substances/ medication, alcohol, illicit drugs and tobacco.     2- Obesity  counseled on eating a healthy diet and exercising as tolerated.        f/u 12 months or sooner as needed.

## 2024-12-12 ENCOUNTER — APPOINTMENT (OUTPATIENT)
Dept: NEUROLOGY | Facility: CLINIC | Age: 44
End: 2024-12-12
Payer: COMMERCIAL

## 2024-12-13 ENCOUNTER — APPOINTMENT (OUTPATIENT)
Dept: CARDIOLOGY | Facility: CLINIC | Age: 44
End: 2024-12-13
Payer: COMMERCIAL

## 2025-02-17 ENCOUNTER — APPOINTMENT (OUTPATIENT)
Dept: BEHAVIORAL HEALTH | Facility: CLINIC | Age: 45
End: 2025-02-17
Payer: COMMERCIAL

## 2025-02-17 DIAGNOSIS — F41.8 MIXED ANXIETY AND DEPRESSIVE DISORDER: ICD-10-CM

## 2025-02-17 PROCEDURE — 99214 OFFICE O/P EST MOD 30 MIN: CPT | Performed by: PSYCHIATRY & NEUROLOGY

## 2025-02-17 RX ORDER — SERTRALINE HYDROCHLORIDE 100 MG/1
100 TABLET, FILM COATED ORAL DAILY
Qty: 90 TABLET | Refills: 1 | Status: SHIPPED | OUTPATIENT
Start: 2025-02-17 | End: 2025-08-16

## 2025-02-17 NOTE — PROGRESS NOTES
Subjective   Patient ID: Minal Stuart is a 44 y.o. female who presents for No chief complaint on file. I am doing well.    Virtual Consent: The patient engaged in a telehealth session via Epic audio visual or phone with this provider practicing within the [facility]. The identity of the patient was verified by their date of birth and last four digits of their social security number. The provider demonstrated that confidentially was preserved at their location. The patient was informed that they were responsible for ensuring confidentially was secured at their location. The patient's location was documented for emergency purposes. The patient was informed of the necessary steps that would occur if an emergency was to occur or technology failed during session.   An interactive audio and video telecommunication system which permits real time communications between the patient (at home) and provider (at the home office) was utilized to provide this telehealth service.   Verbal consent was requested and obtained from Minal Stuart on this date, 02/17/25 for a telehealth visit.     Adult Risk Screening     Spiritual and Cultural: Patient Declined.   Initial Fall Risk Screening:   MINAL has not fallen in the last 6 months.~Her fall did not result in injury.~MINAL does not have a fear of falling.~She does not need assistance with sitting, standing or walking.~Does not need assistance walking in her home.~She does not need assistance in an unfamiliar setting.~The patient is not using an assistive device.       Living Will.   Living Will: Living will on file.   Healthcare POA: Health care proxy on file.   Declaration of Mental Health Treatment: No mental health treatment on file.   Requested patient bring documents again on next visit.   Tobacco Screening: Has not used tobacco in the past 6 months.~Has not tried to quit or thought about quitting tobacco.   Domestic Violence Screen: Does not feel threatened or  abused physically, emotionally or sexually. Do you feel UNSAFE?   The patient feels safe in the home.   Depression/Suicide Screening:   During the past 2 weeks, the patient felt down, depressed or hopeless.~   During the past 2 weeks, the patient has not felt little interest or pleasure in doing things.~   She does not have a risk of suicide.~   She has not had thoughts of harming others.    Single alcohol screening question:   In the past year the patient has had 5 or more drinks (men) or 4 or more drinks (women)? None. time(s).   Single substance abuse screening question:   In the past year the patient has used a recreational drug or used a prescription drug for non-medical reasons? None. time(s).   Procedure or Sedation Areas:   patient has not had alcohol, recreational drugs, or prescription drugs for non-medical reasons this morning.   Nutrition Screening:   In the past month, there was not a day when I or anyone in my family went hungry because there was not enough food.   Patient Education: The patient denies that they or the person with them has problems with hearing, speaking, seeing, moving around or learning   The patient is comfortable filling out medical forms.   Social Determinant of Health   Does the patient have an SDoH need as identified by the screening form? No.~   Does the patient want intervention? No.   Food Insecurity:   1. Within the past 12 months, you worried that your food would run out before you got money to buy more: No   2. Within the past 12 months, the food you bought just didn't last and you didn't have money to get more: No      History of Present Illness  Ms. MARIA ELENA DAY denies any suicidal or homicidal ideation or plans.  The patient reports that she has significant anxiety with anxiety attacks which dates back 12 years ago.  Since last seen she has had anxiety at work but she is much improved.     Review of Systems     Depressive Symptoms: not depressed or irritable,~no loss  of interest,~no change in appetite,~no recent lb weight gain,~no recent lb weight loss,~no insomnia,~no fatigue or loss of energy,~not feeling worthless or guilty,~normal concentration,~ability to make decisions,~no suicidal ideation,~no guns or weapons in household~. Education was provided to the patient.   Manic Symptoms: mood is not irritable or elevated,~self esteem is not grandiose or increased,~no changes in need for sleep,~not more talkative than usual,~does not have flight of ideas or racing thoughts,~no distractibility,~no psychomotor agitation or increased goal-directed activity,~no excessive involvement in pleasurable activities.   Psychotic Symptoms: no hallucinations,~no delusions,~no disorganized speech,~does not have disorganized behavior or catatonia,~no negative symptoms.   Anxiety Symptoms: excessive worry,~difficulty controlling worry,~irritability due to worry,~sleep disturbances due to worry,~obsessions (recurrent thoughts, impulses, or images), but~no panic attacks,~no concerns about future panic attacks,~no worry about panic attack consequences,~no change in behavior due to panic attacks,~no increase in arousal,~not easily fatigued due to worry,~no difficulty concentrating due to worry,~no muscle tension due to worry,~no specific phobia,~no social phobia,~no exposure to traumatic event,~no re-experiencing of traumatic event,~no avoidance of stimuli and number of responsiveness,~no restlessness / feeling on edge due to worry.   Delirium/ Altered Mental Status Symptoms: no disturbances of consciousness,~no diminished ability to focus, sustain, shift attention,~no change in cognition or perceptual disturbances,~symptoms do not fluctuate during the course of the day,~general medical condition is not present.   Post-traumatic stress disorder symptoms irritability, but~no flashbacks,~no intrusive thoughts,~no avoiding triggers,~no emotional numbing,~not feeling detached,~no disinterest in life,~no  relationship problems,~no hopelessness,~no fearfulness,~no startles easily,~no agitation,~no inability to concentrate,~no hypervigilance,~no sleep disturbance,~no nightmares.   Inattentive Symptoms: is not often forgetful.   Other Symptoms/ Concerns: no symptoms of separation anxiety,~no reactive attachment symptoms,~no motor tics,~no vocal tics,~no stuttering,~no phonological problems,~no loss of urine control,~no encopresis,~no intellectual disability,~no self-injurious behaviors,~not somatic and no conversion symptoms,~no gender identity symptoms,~no sleep disorder symptoms,~no impulse control symptoms,~no personality disorder symptoms.   All other systems have been reviewed and are negative for complaint.      Constitutional: nightmares, but~no sleep apnea,~normal sleeping,~no night wakings,~no snoring,~not a picky eater,~normal appetite,~no swallowing problems,~no night terrors,~no restless sleep,~no snorts/gasps~and~no obesity.   ENT: no hearing tested~and~no hearing loss.   Cardiovascular: no murmur,~no heart defect~and~no chest pain.   Respiratory: no wheezing.   Gastrointestinal: no constipation,~no abdominal pain,~no nausea,~no vomiting~and~no diarrhea.   Genitourinary: no nocturnal enuresis~and~no diurnal enuresis.   Musculoskeletal: moving all extremities well and symmetrical,~no myalgias~and~no muscle weakness.   Integumentary: no changes in moles or birthmarks~and~no rashes.   Neurological: symmetrical facies, but~no headache,~no head injury,~no seizures,~no staring spells,~no loss of consciousness,~no meningitis/encephalitis,~no cerebral palsy,~no spina bifida,~no stereotypy,~no developmental regression~and~no tics or twitches.   All other systems have been reviewed and are negative for complaint.      Active Problems and Past  Problems    · Abnormal PFT (794.2) (R94.2)   · Abnormal uterine bleeding (AUB) (626.9) (N93.9)   · Anxiety disorder (300.00) (F41.9)   · Anxiety with obsessional features  (300.00) (F41.8)   · Asthma (493.90) (J45.909)   · Chronic cough (786.2) (R05.3)   · Chronic diarrhea (787.91) (K52.9)   · Chronic left shoulder pain (719.41,338.29) (M25.512,G89.29)   · Connective tissue disorder (710.9) (M35.9)   · Depression (311) (F32.A)   · Elevated blood pressure reading without diagnosis of hypertension (796.2) (R03.0)   · Encounter for immunization (V03.89) (Z23)   · Environmental allergies (V15.09) (Z91.09)   · ETD (eustachian tube dysfunction) (381.81) (H69.90)   · Excessive daytime sleepiness (780.54) (G47.19)   · Fibroids (215.9) (D21.9)   · Hypothyroidism, adult (244.9) (E03.9)   · Insomnia (780.52) (G47.00)   · Menorrhagia (626.2) (N92.0)   · Migraines (346.90) (G43.909)   · WING (obstructive sleep apnea) (327.23) (G47.33)   · Screening mammogram, encounter for (V76.12) (Z12.31)   · Shortness of breath on exertion (786.05) (R06.02)   · Sleep disorder breathing (780.59) (G47.30)   · Subjective pulsatile tinnitus of both ears (388.31) (H93.A3)   · Tortuous artery (447.1) (I77.1)   · Well woman exam with routine gynecological exam (V72.31) (Z01.419)     Past Medical History     Past Psychiatric History   Onset History: Chronic anxiety and anxiety attacks since adolescence.   Inpatient history: None   Suicide attempts/Self-Harm/Ideation History: None   Current providers: Psychologist she currently sees.   Past providers: Saw CNP at [facility].   Past medication trials: Buspirone.      Surgical History  Problems    · History of Ear surgery   · eustachian tubes x2 as a child     Family History  Mother    · Family history of Aneurysm   · vascular   · Family history of Aneurysm of neck (442.81)   · Family history of Anxiety   · Family history of Anxiety with obsessional features   · Family history of Diverticulosis   · Family history of fibromuscular dysplasia (V17.49) (Z82.49)   · Family history of SCAD (short-chain acyl-CoA dehydrogenase deficiency)   · Family history of Sjogren syndrome,  unspecified  Grandmother    · Family history of thyroid disease (V18.19) (Z83.49)   · FH: aortic dissection (V17.49) (Z82.49)  Maternal Grandmother    · Family history of Alcohol abuse  Maternal Great Grandmother    · Family history of thyroid disease (V18.19) (Z83.49)  Maternal Grandfather    · Family history of Alcohol abuse  Maternal Uncle    · Family history of Alcohol abuse     Social History  Problems    · Born in [place]   · Completed college, bachelors degree   · Completed elementary school   · Completed graduate school, masters degree   · Education degrees   · Employed   ·  in [employer]. 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   · Has no children   · Never    · Never smoker   · 2022   · No alcohol use   · No illicit drug use     Allergies  Medication    · Augmentin   Gatrointestinal upset; Recorded By: Mojgan Jorge; 6/20/2023 3:45:34 PM  NonMedication    · Animal dander - Cats   Recorded By: Evita Salomon; 6/29/2022 2:36:32 PM   · No Known Food Allergies   Recorded By: Mojgan Jorge; 6/20/2023 3:47:02 PM     Current Medications prescribed:    Sertraline HCl - 100 MG Oral Tablet TAKE ONE TABLET BY MOUTH EVERY DAY WITH DINNER.     Mental Status Exam     General: Obsessional anxiety in substantial remission. .   Appearance: Obsessional anxiety in substantial remission. .   Attitude: Calm, cooperative   Behavior: Obsessional anxiety in substantial remission.   Motor Activity: No agitation or retardation. No EPS/TD. Normal gait and station.   Speech: Regular rate, rhythm, volume and tone, spontaneous, fluent.   Mood: Obsessional anxiety with labile features in substantial remission. .   Affect: Obsessional anxiety with labile features in substantial remission. .   Thought Process: Obsessional anxiety with labile features in substantial remission. .   Thought Content: Obsessional anxiety with labile features in substantial remission. .   Thought  Perception: Does not endorse auditory or visual hallucinations, does not appear to be responding to hallucinatory stimuli.   Cognition: Alert, oriented x3. No deficits noted. Adequate fund of knowledge. No deficit in recent and remote memory. No deficits in attention, concentration or language.    Insight: Insight is limited.   Judgment: Judgment is limited.         Appearance: well-groomed.   Build: average.   Demeanor: average.   Eye Contact: average.   Motor Activity: average.   Speech: clear.   Language: Neurologic language is intact.   Fund of Knowledge: aware of current events,~fair fund of knowledge.   Delusions: None Reported.   Aggressive: None Reported.   Mood: anxious.   Affect: labile.   Orientation: alert,~oriented x3.   Manner: cooperative.   Thought process: goal-directed.   Thought association: displays rational thought process.   Content of thought: Ms. MARIA ELENA DAY denies any suicidal or homicidal ideation or plans.   Abstract/ Rational Thought: intact   Memory: grossly intact.   Behavior: calm.   Attention/Concentration: normal.   Cognition: intact.   Intelligence Estimate: average.   Executive Function: intact.   Insight:. Insight is limited.   Judgement:. Judgment is limited.           Review of Systems   Neurological:         Mental Status Exam     General: Obsessional anxiety in substantial remission. .   Appearance: Obsessional anxiety in substantial remission. .   Attitude: Calm, cooperative   Behavior: Obsessional anxiety in substantial remission.   Motor Activity: No agitation or retardation. No EPS/TD. Normal gait and station.   Speech: Regular rate, rhythm, volume and tone, spontaneous, fluent.   Mood: Obsessional anxiety with labile features in substantial remission. .   Affect: Obsessional anxiety with labile features in substantial remission. .   Thought Process: Obsessional anxiety with labile features in substantial remission. .   Thought Content: Obsessional anxiety with labile  features in substantial remission. .   Thought Perception: Does not endorse auditory or visual hallucinations, does not appear to be responding to hallucinatory stimuli.   Cognition: Alert, oriented x3. No deficits noted. Adequate fund of knowledge. No deficit in recent and remote memory. No deficits in attention, concentration or language.    Insight: Insight is limited.   Judgment: Judgment is limited.         Appearance: well-groomed.   Build: average.   Demeanor: average.   Eye Contact: average.   Motor Activity: average.   Speech: clear.   Language: Neurologic language is intact.   Fund of Knowledge: aware of current events,~fair fund of knowledge.   Delusions: None Reported.   Aggressive: None Reported.   Mood: anxious.   Affect: labile.   Orientation: alert,~oriented x3.   Manner: cooperative.   Thought process: goal-directed.   Thought association: displays rational thought process.   Content of thought: Ms. MARIA ELENA DAY denies any suicidal or homicidal ideation or plans.   Abstract/ Rational Thought: intact   Memory: grossly intact.   Behavior: calm.   Attention/Concentration: normal.   Cognition: intact.   Intelligence Estimate: average.   Executive Function: intact.   Insight:. Insight is limited.   Judgement:. Judgment is limited.      Psychiatric/Behavioral:          Mental Status Exam     General: Obsessional anxiety in substantial remission. .   Appearance: Obsessional anxiety in substantial remission. .   Attitude: Calm, cooperative   Behavior: Obsessional anxiety in substantial remission.   Motor Activity: No agitation or retardation. No EPS/TD. Normal gait and station.   Speech: Regular rate, rhythm, volume and tone, spontaneous, fluent.   Mood: Obsessional anxiety with labile features in substantial remission. .   Affect: Obsessional anxiety with labile features in substantial remission. .   Thought Process: Obsessional anxiety with labile features in substantial remission. .   Thought Content:  Obsessional anxiety with labile features in substantial remission. .   Thought Perception: Does not endorse auditory or visual hallucinations, does not appear to be responding to hallucinatory stimuli.   Cognition: Alert, oriented x3. No deficits noted. Adequate fund of knowledge. No deficit in recent and remote memory. No deficits in attention, concentration or language.    Insight: Insight is limited.   Judgment: Judgment is limited.         Appearance: well-groomed.   Build: average.   Demeanor: average.   Eye Contact: average.   Motor Activity: average.   Speech: clear.   Language: Neurologic language is intact.   Fund of Knowledge: aware of current events,~fair fund of knowledge.   Delusions: None Reported.   Aggressive: None Reported.   Mood: anxious.   Affect: labile.   Orientation: alert,~oriented x3.   Manner: cooperative.   Thought process: goal-directed.   Thought association: displays rational thought process.   Content of thought: Ms. MARIA ELENA DAY denies any suicidal or homicidal ideation or plans.   Abstract/ Rational Thought: intact   Memory: grossly intact.   Behavior: calm.   Attention/Concentration: normal.   Cognition: intact.   Intelligence Estimate: average.   Executive Function: intact.   Insight:. Insight is limited.   Judgement:. Judgment is limited.      All other systems reviewed and are negative.    Psych Review of Symptoms:    ADHD: Patient denied any symptoms.         Anxiety: Patient denied any symptoms.         Developmental and Sensory Concerns: Patient denied any symptoms.         Depressive Symptoms: Patient denied any symptoms.         Disruptive and Conduct Symptoms: Patient denied any symptoms.         Eating / Feeding Concerns: Patient denied any symptoms.         Elimination Symptoms: Patient denied any symptoms.         Manic Symptoms: Patient denied any symptoms.         Obsessive-Compulsive Symptoms: Patient denied any symptoms.         Psychotic Symptoms: Patient denied  any symptoms.           Trauma Related Symptoms: Patient denied any symptoms.           Sleep Concerns: Patient denied any symptoms.             Objective   Physical Exam  Neurological:      Mental Status: She is alert and oriented to person, place, and time.      Comments: Mental Status Exam     General: Obsessional anxiety in substantial remission. .   Appearance: Obsessional anxiety in substantial remission. .   Attitude: Calm, cooperative   Behavior: Obsessional anxiety in substantial remission.   Motor Activity: No agitation or retardation. No EPS/TD. Normal gait and station.   Speech: Regular rate, rhythm, volume and tone, spontaneous, fluent.   Mood: Obsessional anxiety with labile features in substantial remission. .   Affect: Obsessional anxiety with labile features in substantial remission. .   Thought Process: Obsessional anxiety with labile features in substantial remission. .   Thought Content: Obsessional anxiety with labile features in substantial remission. .   Thought Perception: Does not endorse auditory or visual hallucinations, does not appear to be responding to hallucinatory stimuli.   Cognition: Alert, oriented x3. No deficits noted. Adequate fund of knowledge. No deficit in recent and remote memory. No deficits in attention, concentration or language.    Insight: Insight is limited.   Judgment: Judgment is limited.         Appearance: well-groomed.   Build: average.   Demeanor: average.   Eye Contact: average.   Motor Activity: average.   Speech: clear.   Language: Neurologic language is intact.   Fund of Knowledge: aware of current events,~fair fund of knowledge.   Delusions: None Reported.   Aggressive: None Reported.   Mood: anxious.   Affect: labile.   Orientation: alert,~oriented x3.   Manner: cooperative.   Thought process: goal-directed.   Thought association: displays rational thought process.   Content of thought: Ms. MARIA ELENA DAY denies any suicidal or homicidal ideation or plans.    Abstract/ Rational Thought: intact   Memory: grossly intact.   Behavior: calm.   Attention/Concentration: normal.   Cognition: intact.   Intelligence Estimate: average.   Executive Function: intact.   Insight:. Insight is limited.   Judgement:. Judgment is limited.      Psychiatric:         Mood and Affect: Mood normal.         Behavior: Behavior normal.         Thought Content: Thought content normal.         Judgment: Judgment normal.      Comments: Mental Status Exam     General: Obsessional anxiety in substantial remission. .   Appearance: Obsessional anxiety in substantial remission. .   Attitude: Calm, cooperative   Behavior: Obsessional anxiety in substantial remission.   Motor Activity: No agitation or retardation. No EPS/TD. Normal gait and station.   Speech: Regular rate, rhythm, volume and tone, spontaneous, fluent.   Mood: Obsessional anxiety with labile features in substantial remission. .   Affect: Obsessional anxiety with labile features in substantial remission. .   Thought Process: Obsessional anxiety with labile features in substantial remission. .   Thought Content: Obsessional anxiety with labile features in substantial remission. .   Thought Perception: Does not endorse auditory or visual hallucinations, does not appear to be responding to hallucinatory stimuli.   Cognition: Alert, oriented x3. No deficits noted. Adequate fund of knowledge. No deficit in recent and remote memory. No deficits in attention, concentration or language.    Insight: Insight is limited.   Judgment: Judgment is limited.         Appearance: well-groomed.   Build: average.   Demeanor: average.   Eye Contact: average.   Motor Activity: average.   Speech: clear.   Language: Neurologic language is intact.   Fund of Knowledge: aware of current events,~fair fund of knowledge.   Delusions: None Reported.   Aggressive: None Reported.   Mood: anxious.   Affect: labile.   Orientation: alert,~oriented x3.   Manner: cooperative.    Thought process: goal-directed.   Thought association: displays rational thought process.   Content of thought: Ms. MARIA ELENA DAY denies any suicidal or homicidal ideation or plans.   Abstract/ Rational Thought: intact   Memory: grossly intact.   Behavior: calm.   Attention/Concentration: normal.   Cognition: intact.   Intelligence Estimate: average.   Executive Function: intact.   Insight:. Insight is limited.   Judgement:. Judgment is limited.            Lab Review:   No visits with results within 6 Month(s) from this visit.   Latest known visit with results is:   Office Visit on 04/25/2024   Component Date Value    Betina Score 04/25/2024 1     Yeast 04/25/2024 ABSENT     Clue Cells 04/25/2024 ABSENT        Assessment/Plan   Psychiatric Risk Assessment  Violence Risk Assessment: none  Acute Risk of Harm to Others is Considered: low   Suicide Risk Assessment: age > 65 yrs old and   Protective Factors against Suicide: adherence to  treatment, fear of suicide, moral objections to suicide, positive family relationships, and sense of responsibility toward family  Acute Risk of Harm to Self is Considered: low    Imminent Risk of Suicide or Serious Self-Injury: Low   Chronic Risk of Suicide of Serious Self-Injury: Low  Risk factors: Age, depression history and   Protective factors: Denies current suicidal ideation, denies history of suicide attempts , willingness to seek help and support , gender, access to a variety of clinical interventions , and receiving and engaged in care for mental, physical, and substance use disorders      Imminent Risk of Violence or Homicide: Low   Risk Factors: No significant risk factors identified on screening  Protective Factors: Lack of known history of harm to others , Lack of known history of violent ideation , and lack of known access to firearms.     Assessment & Plan  Mixed anxiety and depressive disorder  Your diagnosis is obsessional anxiety with panic attacks in  substantial remission.     Please follow up through psychiatry in June of 2025 and sooner virtually if needed as discussed today. For all urgent or emergency matters please call 911, go to urgent care or the emergency department of the nearest hospital.      Please continue with seeing your therapist whom you are seeing weekly for CBT.     The plan is to continue sertraline 100 mg with food daily as discussed today. You clearly are able to recite back the risks, benefits, potential interactions and alternatives of these medications as discussed with you today.     The FDA risks of antidepressants including increased risk of suicide, cardiotoxicity, weight gain, lowered seizure threshold, the serotonin syndrome with serotonin agents, individually or with multiple serotonin agent interaction and anticholinergic effects have been discussed. We also discussed that at least in the case of SSRIs there is interference with warfarin and nonsteroidal inflammatories and can cause increased bleeding and hemorrhage. We reviewed the risks of agranulocytosis and neutropenia with sertraline and mirtazapine and that risk according to [employer] data is about 0.1% for both mirtazapine and sertraline. You clearly were able to recite back the risks, benefits and alternatives to the antidepressants discussed.     Orders:    sertraline (Zoloft) 100 mg tablet; Take 1 tablet (100 mg) by mouth once daily.    Time:   Prep time on date of the patient encounter:  5 minutes.   Time spent directly with patient/family/caregiver: 20 minutes.   Additional time spent on patient care activities:  minutes.   Documentation time: 5 minutes.   Total time on date of patient encounter: 30 minutes.

## 2025-04-08 ENCOUNTER — APPOINTMENT (OUTPATIENT)
Dept: ENDOCRINOLOGY | Facility: CLINIC | Age: 45
End: 2025-04-08
Payer: COMMERCIAL

## 2025-09-04 DIAGNOSIS — E03.9 HYPOTHYROIDISM, UNSPECIFIED TYPE: ICD-10-CM

## 2025-09-05 RX ORDER — LEVOTHYROXINE SODIUM 50 UG/1
50 TABLET ORAL DAILY
Qty: 90 TABLET | Refills: 1 | Status: SHIPPED | OUTPATIENT
Start: 2025-09-05

## 2025-11-07 ENCOUNTER — APPOINTMENT (OUTPATIENT)
Facility: CLINIC | Age: 45
End: 2025-11-07
Payer: COMMERCIAL